# Patient Record
Sex: MALE | Race: WHITE
[De-identification: names, ages, dates, MRNs, and addresses within clinical notes are randomized per-mention and may not be internally consistent; named-entity substitution may affect disease eponyms.]

---

## 2018-05-04 ENCOUNTER — HOSPITAL ENCOUNTER (OUTPATIENT)
Dept: HOSPITAL 75 - RAD | Age: 43
End: 2018-05-04
Attending: INTERNAL MEDICINE
Payer: COMMERCIAL

## 2018-05-04 DIAGNOSIS — R10.9: Primary | ICD-10-CM

## 2018-05-04 LAB
ALBUMIN SERPL-MCNC: 4.7 GM/DL (ref 3.2–4.5)
ALP SERPL-CCNC: 93 U/L (ref 40–136)
ALT SERPL-CCNC: 17 U/L (ref 0–55)
AMYLASE SERPL-CCNC: 64 U/L (ref 25–125)
APTT PPP: YELLOW S
BACTERIA #/AREA URNS HPF: NEGATIVE /HPF
BASOPHILS # BLD AUTO: 0 10^3/UL (ref 0–0.1)
BASOPHILS NFR BLD AUTO: 0 % (ref 0–10)
BILIRUB SERPL-MCNC: 1.1 MG/DL (ref 0.1–1)
BILIRUB UR QL STRIP: NEGATIVE
BUN/CREAT SERPL: 15
CALCIUM SERPL-MCNC: 9.8 MG/DL (ref 8.5–10.1)
CHLORIDE SERPL-SCNC: 106 MMOL/L (ref 98–107)
CO2 SERPL-SCNC: 23 MMOL/L (ref 21–32)
CREAT SERPL-MCNC: 0.85 MG/DL (ref 0.6–1.3)
EOSINOPHIL # BLD AUTO: 0 10^3/UL (ref 0–0.3)
EOSINOPHIL NFR BLD AUTO: 0 % (ref 0–10)
ERYTHROCYTE [DISTWIDTH] IN BLOOD BY AUTOMATED COUNT: 12.4 % (ref 10–14.5)
ERYTHROCYTE [SEDIMENTATION RATE] IN BLOOD: 2 MM/HR (ref 0–15)
FIBRINOGEN PPP-MCNC: CLEAR MG/DL
GFR SERPLBLD BASED ON 1.73 SQ M-ARVRAT: > 60 ML/MIN
GLUCOSE SERPL-MCNC: 112 MG/DL (ref 70–105)
GLUCOSE UR STRIP-MCNC: NEGATIVE MG/DL
HCT VFR BLD CALC: 44 % (ref 40–54)
HGB BLD-MCNC: 15.2 G/DL (ref 13.3–17.7)
KETONES UR QL STRIP: (no result)
LEUKOCYTE ESTERASE UR QL STRIP: NEGATIVE
LIPASE SERPL-CCNC: 13 U/L (ref 8–78)
LYMPHOCYTES # BLD AUTO: 1.2 X 10^3 (ref 1–4)
LYMPHOCYTES NFR BLD AUTO: 21 % (ref 12–44)
MANUAL DIFFERENTIAL PERFORMED BLD QL: NO
MCH RBC QN AUTO: 30 PG (ref 25–34)
MCHC RBC AUTO-ENTMCNC: 35 G/DL (ref 32–36)
MCV RBC AUTO: 86 FL (ref 80–99)
MONOCYTES # BLD AUTO: 0.4 X 10^3 (ref 0–1)
MONOCYTES NFR BLD AUTO: 7 % (ref 0–12)
NEUTROPHILS # BLD AUTO: 4 X 10^3 (ref 1.8–7.8)
NEUTROPHILS NFR BLD AUTO: 71 % (ref 42–75)
NITRITE UR QL STRIP: NEGATIVE
PH UR STRIP: 6.5 [PH] (ref 5–9)
PLATELET # BLD: 246 10^3/UL (ref 130–400)
PMV BLD AUTO: 9.5 FL (ref 7.4–10.4)
POTASSIUM SERPL-SCNC: 4 MMOL/L (ref 3.6–5)
PROT SERPL-MCNC: 7.3 GM/DL (ref 6.4–8.2)
PROT UR QL STRIP: NEGATIVE
RBC # BLD AUTO: 5.06 10^6/UL (ref 4.35–5.85)
RBC #/AREA URNS HPF: (no result) /HPF
SODIUM SERPL-SCNC: 138 MMOL/L (ref 135–145)
SP GR UR STRIP: 1.01 (ref 1.02–1.02)
SQUAMOUS #/AREA URNS HPF: (no result) /HPF
UROBILINOGEN UR-MCNC: NORMAL MG/DL
WBC # BLD AUTO: 5.6 10^3/UL (ref 4.3–11)
WBC #/AREA URNS HPF: (no result) /HPF

## 2018-05-04 PROCEDURE — 83690 ASSAY OF LIPASE: CPT

## 2018-05-04 PROCEDURE — 80053 COMPREHEN METABOLIC PANEL: CPT

## 2018-05-04 PROCEDURE — 81000 URINALYSIS NONAUTO W/SCOPE: CPT

## 2018-05-04 PROCEDURE — 86141 C-REACTIVE PROTEIN HS: CPT

## 2018-05-04 PROCEDURE — 36415 COLL VENOUS BLD VENIPUNCTURE: CPT

## 2018-05-04 PROCEDURE — 82150 ASSAY OF AMYLASE: CPT

## 2018-05-04 PROCEDURE — 76700 US EXAM ABDOM COMPLETE: CPT

## 2018-05-04 PROCEDURE — 85652 RBC SED RATE AUTOMATED: CPT

## 2018-05-04 PROCEDURE — 85025 COMPLETE CBC W/AUTO DIFF WBC: CPT

## 2018-05-04 NOTE — DIAGNOSTIC IMAGING REPORT
CLINICAL INDICATION: Patient with abdominal pain.



COMPARISON: None. 



FINDINGS: 



LIVER: The visualized portions of the liver is normal in shape

and echogenicity without focal lesions. The liver measures

roughly 15 cm in craniocaudal dimension. The visualized portal

veins demonstrates hepatopetal flow.



GALLBLADDER: The gallbladder is normal in size, shape and wall

thickness without stones, sludge, or masses. 



BILE DUCTS: There is no evidence of intra- or extra-hepatic

biliary dilatation.  The common duct measured a maximum of 3.7 mm

in diameter.  



PANCREAS: Portions of the pancreatic tail are obscured by

overlying bowel gas. Otherwise, the remaining visualized portions

of the pancreas has normal size, shape, and echogenicity without

focal lesions.  



SPLEEN: The spleen has normal echogenicity and configuration. The

spleen measures 9.3 cm.



ABDOMINAL VASCULATURE: Visualized portions of the abdominal aorta

and proximal bilateral common iliac arteries demonstrate smooth

contours and are normal in caliber.  There is no aneurysmal

dilation seen.



Visualized portion of the IVC is unremarkable.



KIDNEYS: Both kidneys are normal in size, shape, echogenicity and

cortical thickness without hydronephrosis, stones, or focal

lesions with right and left kidneys measuring 9.6 cm and 11.0 cm

in their craniocaudal dimensions, respectively.



There is no abdominal ascites.



IMPRESSION:  

1: Incomplete visualization of the pancreas tail which is

obscured by bowel gas. If there is clinical concern for

pancreatic abnormality, serology tests may better evaluate. 



2:  Otherwise, unremarkable abdominal ultrasound with no evidence

of acute process. 















Dictated by: 



  Dictated on workstation # [id number]

## 2018-05-08 ENCOUNTER — HOSPITAL ENCOUNTER (OUTPATIENT)
Dept: HOSPITAL 75 - ENDO | Age: 43
Discharge: HOME | End: 2018-05-08
Attending: SURGERY
Payer: COMMERCIAL

## 2018-05-08 VITALS — DIASTOLIC BLOOD PRESSURE: 72 MMHG | SYSTOLIC BLOOD PRESSURE: 112 MMHG

## 2018-05-08 VITALS — SYSTOLIC BLOOD PRESSURE: 114 MMHG | DIASTOLIC BLOOD PRESSURE: 77 MMHG

## 2018-05-08 VITALS — DIASTOLIC BLOOD PRESSURE: 83 MMHG | SYSTOLIC BLOOD PRESSURE: 117 MMHG

## 2018-05-08 VITALS — DIASTOLIC BLOOD PRESSURE: 77 MMHG | SYSTOLIC BLOOD PRESSURE: 114 MMHG

## 2018-05-08 VITALS — BODY MASS INDEX: 24.14 KG/M2 | HEIGHT: 69 IN | WEIGHT: 163 LBS

## 2018-05-08 DIAGNOSIS — K29.70: Primary | ICD-10-CM

## 2018-05-08 PROCEDURE — 88305 TISSUE EXAM BY PATHOLOGIST: CPT

## 2018-05-08 NOTE — XMS REPORT
Vianey Stiles DO, FACP Clinical Summary

 Created on: 2015



[facility]

External Reference #: [id number]

: 1975

Sex: Male



Demographics







 Address  [address] [place]  [address]

 

 Home Phone  [phone]

 

 Preferred Language  Unknown

 

 Marital Status  M

 

 [employer]ology Affiliation  Unknown

 

 Race  Unknown

 

 Ethnic Group  Unknown





Author







 Author  User, Trippeo

 

 Organization  [facility], F[facility]

 

 Address  Unknown

 

 Phone  [phone]







Allergies, Adverse Reactions, Alerts







 Allergy Name  Reaction Description  Start Date  Severity  Status  Provider

 

 No Known Allergies             Vianey Stiles







Conditions or Problems







 Problem Name  Problem Code  Onset Date  Status  Entry Date  Provider  Comment  
Standard Description  Annotate

 

 SACROILIITIS, NOT ELSEWHERE CLASSIFIED  720.2    Active    
Vianey Stiles     Sacroiliitis, not elsewhere classified   

 

 OTHER PSORIASIS  696.1    Active    Vianey Stiles  
   Other psoriasis   

 

 BACK PAIN  724.5    Active    Vianey Stiles     
Backache, unspecified   

 

 MUSCLE PAIN  729.1    Active    Vianey Stiles     
Myalgia and myositis, unspecified   

 

 HEALTH SCREENING  V70.0    Active    Vianey Stiles 
    Routine general medical examination at a health care facility   







Medication List







 Medication  Instructions  Start Date  Stop Date  Generic Name  NDC  Status  
Provider  Patient Instruction

 

 BETAMETHASONE DIPROPIONATE 0.05 % OINT  apply to affected areas prn       BETAMETHASONE DIPROPIONATE  [id number]  Active  Vianey Stiles   







Vital Signs







 Date  Name  Value  Unit  Range  Description

 

   blood pressure, diastolic - 8462-4  83  mm[Hg]     BP haney

 

   blood pressure, systolic - 8480-6  132  mm[Hg]     BP sys

 

   pulse rate E&M - 8867-4  68  /min     Heart rate

 

   respiratory rate E&M - 9279-1  14  /min     Resp rate

 

   weight E&M - 3141-9  170  [lb_av]     Weight Measured

 

   blood pressure, diastolic - 8462-4  85  mm[Hg]     BP haney

 

   blood pressure, systolic - 8480-6  120  mm[Hg]     BP sys

 

   height E&M - 8302-2  69  [in_us]     Bdy height

 

   pulse rate E&M - 8867-4  82  /min     Heart rate

 

   respiratory rate E&M - 9279-1  14  /min     Resp rate

 

   temperature E&M  98.6  [degF]     Body temperature

 

   weight E&M - 3141-9  170  [lb_av]     Weight Measured







Diagnostic Results







 Date  Name  Value  Unit  Range  Description

 

 Clinical Lists Update: CBC,CMP,ESR,TSH,FREE T4,HGA1C,UA - Chemistry 

 

   Estimated Glomerular Filtration Rate (calc)  >60  mL/min/1.73m2   
   

 

   alkaline phosphatase, serum  80  U/L      

 

   sodium, serum  138  mmol/L      

 

   bilirubin, serum, total  0.5  mg/dL      

 

   alanine aminotransferase (SGPT), serum  24  U/L      

 

   aspartate aminotransferase (SGOT), serum  17  U/L      

 

   protein, total, serum  7.0  g/dL      

 

   potassium, serum  3.8  mmol/L      

 

   thyroid stimulating hormone, serum  2.68  u[iU]/mL      

 

   hemoglobin A1C, blood, as % of total hemoglobin  5.8  %      

 

   thyroxine, serum, free  0.88  ng/dL      

 

   albumin, serum  4.4  g/dL      

 

   creatinine, serum  0.91  mg/dL      

 

   carbon dioxide, venous blood  23  mmol/L      

 

   chloride, serum  106  mmol/L      

 

   calcium, serum  9.3  mg/dL      

 

   urea nitrogen, blood  16  mg/dL      

 

   glucose, plasma fasting  98  mg/dL      

 

 Clinical Lists Update: CBC,CMP,ESR,TSH,FREE T4,HGA1C,UA - Hematology 

 

   leukocyte count, blood  6.7  10*3/mm3      

 

   erythrocyte sedimentation rate  6  mm/h      

 

   hematocrit, blood  42  %      

 

   hemoglobin, blood  14.7  g/dL      

 

   platelet count  215  10*3/mm3      

 

   erythrocyte (RBC) count  4.89  10*6/mm3      

 

   mean corpuscular volume, RBC  86  fL      

 

   red blood cell distribution width  12.4  %      

 

 Clinical Lists Update: CBC,CMP,ESR,TSH,FREE T4,HGA1C,UA - Urinalysis 

 

   glucose, urine, semiquantitative  neg         

 

   blood in urine (hemoglobin) by dipstick  neg         

 

   mucus on urinalysis  neg         

 

   epithelial cells, urine  rare  /[LPF]      

 

   hyaline casts, urine  none  /[LPF]      

 

   bacteria, urine microscopy  neg         

 

   RBC urine by microscopy  none         

 

   WBC urine on microscopy  none  {Cells}/[HPF]      

 

   appearance, urine  Clear Yellow          

 

   bilirubin, urine  neg         

 

   ketones, urine, by test strip  neg         

 

   nitrite, urine, semiquantitative  neg         

 

   pH, urine, semiquantitative  6         

 

   protein, urine, semiquantitative (dipstick)  neg         

 

   specific gravity, urine  1.010         

 

   urobilinogen, urine, semiquantitative (dipstick)  normal          

 

 Clinical Lists Update: FERRITIN - Chemistry 

 

   ferritin, serum  97  ng/mL      







Encounters







 Code  Encounter  Date  Provider  Facility

 

 CPT-02516  Ofc Vst, New Level III   17:08:21 CDT  [facility]







Procedures







 Code  Procedure Name  Date  Entry Date  Standard Description

 

 CPT-45149  Preventive, Est, (40-64)   16:37:57 CDT

## 2018-05-08 NOTE — XMS REPORT
Vianey Stiles DO, FACP Clinical Summary

 Created on: 2015



[facility]

External Reference #: [id number]

: 1975

Sex: Male



Demographics







 Address  [address] [place]  [address]

 

 Home Phone  [phone]

 

 Preferred Language  Unknown

 

 Marital Status  M

 

 Worship Affiliation  Unknown

 

 Race  Unknown

 

 Ethnic Group  Unknown





Author







 Author  User, Elevation [employer]DANDY

 

 Organization  [facility] F[facility]

 

 Address  Unknown

 

 Phone  [phone]







Allergies, Adverse Reactions, Alerts







 Allergy Name  Reaction Description  Start Date  Severity  Status  Provider

 

 No Known Allergies             [facility]







Conditions or Problems







 Problem Name  Problem Code  Onset Date  Status  Entry Date  Provider  Comment  
Standard Description  Annotate

 

 SACROILIITIS, NOT ELSEWHERE CLASSIFIED  720.2    Active    
[facility]ianey Stiles     Sacroiliitis, not elsewhere classified   

 

 OTHER PSORIASIS  696.1    Active    [facility]ian[facility] Stiles  
   Other psoriasis   

 

 BACK PAIN  724.5    Active    Vianey Stiles     
Backache, unspecified   

 

 MUSCLE PAIN  729.1    Active    [facility] St[facility]     
Myalgia and myositis, unspecified   







Medication List







 Medication  Instructions  Start Date  Stop Date  Generic Name  NDC  Status  
Provider  Patient Instruction

 

 BETAMETHASONE DIPROPIONATE 0.05 % OINT  apply to affected areas prn       BETAMETHASONE DIPROPIONATE  [id number]  Active  Vianey Stiles   







Vital Signs







 Date  Name  Value  Unit  Range  Description

 

   blood pressure, diastolic - 8462-4  85  mm[Hg]     BP haney

 

   blood pressure, systolic - 8480-6  120  mm[Hg]     BP sys

 

   height E&M - 8302-2  69  [in_us]     Bdy height

 

   pulse rate E&M - 8867-4  82  /min     Heart rate

 

   respiratory rate E&M - 9279-1  14  /min     Resp rate

 

   temperature E&M  98.6  [degF]     Body temperature

 

   weight E&M - 3141-9  170  [lb_av]     Weight Measured







Diagnostic Results







 Date  Name  Value  Unit  Range  Description

 

 Clinical Lists Update: CBC,CMP,ESR,TSH,FREE T4,HGA1C,UA - Chemistry 

 

   Estimated Glomerular Filtration Rate (calc)  >60  mL/min/1.73m2   
   

 

   alkaline phosphatase, serum  80  U/L      

 

   sodium, serum  138  mmol/L      

 

   bilirubin, serum, total  0.5  mg/dL      

 

   alanine aminotransferase (SGPT), serum  24  U/L      

 

   aspartate aminotransferase (SGOT), serum  17  U/L      

 

   protein, total, serum  7.0  g/dL      

 

   potassium, serum  3.8  mmol/L      

 

   thyroid stimulating hormone, serum  2.68  u[iU]/mL      

 

   hemoglobin A1C, blood, as % of total hemoglobin  5.8  %      

 

   thyroxine, serum, free  0.88  ng/dL      

 

   albumin, serum  4.4  g/dL      

 

   creatinine, serum  0.91  mg/dL      

 

   carbon dioxide, venous blood  23  mmol/L      

 

   chloride, serum  106  mmol/L      

 

   calcium, serum  9.3  mg/dL      

 

   urea nitrogen, blood  16  mg/dL      

 

   glucose, plasma fasting  98  mg/dL      

 

 Clinical Lists Update: CBC,CMP,ESR,TSH,FREE T4,HGA1C,UA - Hematology 

 

   leukocyte count, blood  6.7  10*3/mm3      

 

   erythrocyte sedimentation rate  6  mm/h      

 

   hematocrit, blood  42  %      

 

   hemoglobin, blood  14.7  g/dL      

 

   platelet count  215  10*3/mm3      

 

   erythrocyte (RBC) count  4.89  10*6/mm3      

 

   mean corpuscular volume, RBC  86  fL      

 

   red blood cell distribution width  12.4  %      

 

 Clinical Lists Update: CBC,CMP,ESR,TSH,FREE T4,HGA1C,UA - Urinalysis 

 

   glucose, urine, semiquantitative  neg         

 

   blood in urine (hemoglobin) by dipstick  neg         

 

   mucus on urinalysis  neg         

 

   epithelial cells, urine  rare  /[LPF]      

 

   hyaline casts, urine  none  /[LPF]      

 

   bacteria, urine microscopy  neg         

 

   RBC urine by microscopy  none         

 

   WBC urine on microscopy  none  {Cells}/[HPF]      

 

   appearance, urine  Clear Yellow          

 

   bilirubin, urine  neg         

 

   ketones, urine, by test strip  neg         

 

   nitrite, urine, semiquantitative  neg         

 

   pH, urine, semiquantitative  6         

 

   protein, urine, semiquantitative (dipstick)  neg         

 

   specific gravity, urine  1.010         

 

   urobilinogen, urine, semiquantitative (dipstick)  normal          

 

 Clinical Lists Update: FERRITIN - Chemistry 

 

   ferritin, serum  97  ng/mL      







Encounters







 Code  Encounter  Date  Provider  Facility

 

 CPT-09796  Ofc Vst, New Level III   17:08:21 CDT  [facility]

## 2018-05-08 NOTE — PROGRESS NOTE-POST OPERATIVE
Post-Operative Progess Note


Surgeon (s)/Assistant (s)


Surgeon


JASON CARABALLO DO


Assistant:  none





Pre-Operative Diagnosis


Gastritis, Abdominal Fullnes, Abdominal pain





Post-Operative Diagnosis





Gastritis


?? Haynes's Esophagus


Esophageal polyp





Procedure & Operative Findings


Date of Procedure


5/8/18


Procedure Performed/Findings


EGD with biopsy


Anesthesia Type


IV sedation by CRNA





Estimated Blood Loss


Estimated blood loss (mL):  scant





Specimens/Packing


Specimens Removed


Antral bx


GE jxn bx


Esophageal bx











JASON CARABALLO DO May 8, 2018 11:41

## 2018-05-08 NOTE — PROGRESS NOTE-PRE OPERATIVE
Pre-Operative Progress Note


H&P Reviewed


The H&P was reviewed, patient examined and no changes noted.


Time Seen by Provider:  11:11


Date H&P Reviewed:  May 8, 2018


Time H&P Reviewed:  11:13


Pre-Operative Diagnosis:  Gastritis, Abdominal Fullnes, Abdominal pain











JASON CARABALLO DO May 8, 2018 11:16

## 2018-05-08 NOTE — XMS REPORT
Vianey Stiles DO, FACP Clinical Summary

 Created on: 2015



[facility]

External Reference #: [id number]

: 1975

Sex: Male



Demographics







 Address  [address]

[place]  [address]

 

 Home Phone  [phone]

 

 Preferred Language  Unknown

 

 Marital Status  M

 

 Confucianist Affiliation  Unknown

 

 Race  Unknown

 

 Ethnic Group  Unknown





Author







 Author  User, MESoftDANDY

 

 Organization  [facility] DO, FACP

 

 Address  Unknown

 

 Phone  [phone]







Allergies, Adverse Reactions, Alerts







 Allergy Name  Reaction Description  Start Date  Severity  Status  Provider

 

 No Known Allergies             Vianey Stiles







Conditions or Problems







 Problem Name  Problem Code  Onset Date  Status  Entry Date  Provider  Comment  
Standard Description  Annotate

 

 SACROILIITIS, NOT ELSEWHERE CLASSIFIED  720.2    Active    
Vianey Stiles     Sacroiliitis, not elsewhere classified   

 

 OTHER PSORIASIS  696.1    Active    [facility]ey Stiles  
   Other psoriasis   

 

 BACK PAIN  724.5    Active    Vianey Stiles     
Backache, unspecified   

 

 MUSCLE PAIN  729.1    Active    [facility]ey Stiles     
Myalgia and myositis, unspecified   

 

 HEALTH SCREENING  V70.0    Active    Vianey Stiles 
    Routine general medical examination at a health care facility   

 

 ABDOMINAL PAIN, GENERALIZED  789.07    Active    Vianey Stiles     Abdominal pain, generalized   

 

 GERD  530.81    Active    Vianey Stiles     
Esophageal reflux   







Medication List







 Medication  Instructions  Start Date  Stop Date  Generic Name  NDC  Status  
Provider  Patient Instruction

 

 CARAFATE 1 GM TABS  1 PO 30 minutes before meals and at bedtime     
  SUCRALFATE  [id number]  Active  [facility]   

 

 BETAMETHASONE DIPROPIONATE 0.05 % OINT  apply to affected areas prn       BETAMETHASONE DIPROPIONATE  [id number]  Active  [facility]   







Vital Signs







 Date  Name  Value  Unit  Range  Description

 

   blood pressure, diastolic - 8462-4  88  mm[Hg]     BP haney

 

   blood pressure, systolic - 8480-6  154  mm[Hg]     BP sys

 

   pulse rate E&M - 8867-4  80  /min     Heart rate

 

   respiratory rate E&M - 9279-1  14  /min     Resp rate

 

   temperature E&M  98.6  [degF]     Body temperature

 

   weight E&M - 3141-9  165  [lb_av]     Weight Measured

 

   blood pressure, diastolic - 8462-4  83  mm[Hg]     BP haney

 

   blood pressure, systolic - 8480-6  132  mm[Hg]     BP sys

 

   pulse rate E&M - 8867-4  68  /min     Heart rate

 

   respiratory rate E&M - 9279-1  14  /min     Resp rate

 

   weight E&M - 3141-9  170  [lb_av]     Weight Measured

 

   blood pressure, diastolic - 8462-4  85  mm[Hg]     BP haney

 

   blood pressure, systolic - 8480-6  120  mm[Hg]     BP sys

 

   height E&M - 8302-2  69  [in_us]     Bdy height

 

   pulse rate E&M - 8867-4  82  /min     Heart rate

 

   respiratory rate E&M - 9279-1  14  /min     Resp rate

 

   temperature E&M  98.6  [degF]     Body temperature

 

   weight E&M - 3141-9  170  [lb_av]     Weight Measured







Diagnostic Results







 Date  Name  Value  Unit  Range  Description

 

 Clinical Lists Update: CBC,CMP,ESR,TSH,FREE T4,HGA1C,UA - Chemistry 

 

   albumin, serum  4.4  g/dL      

 

   alkaline phosphatase, serum  80  U/L      

 

   glucose, plasma fasting  98  mg/dL      

 

   sodium, serum  138  mmol/L      

 

   bilirubin, serum, total  0.5  mg/dL      

 

   alanine aminotransferase (SGPT), serum  24  U/L      

 

   aspartate aminotransferase (SGOT), serum  17  U/L      

 

   protein, total, serum  7.0  g/dL      

 

   potassium, serum  3.8  mmol/L      

 

   thyroid stimulating hormone, serum  2.68  u[iU]/mL      

 

   hemoglobin A1C, blood, as % of total hemoglobin  5.8  %      

 

   thyroxine, serum, free  0.88  ng/dL      

 

   creatinine, serum  0.91  mg/dL      

 

   carbon dioxide, venous blood  23  mmol/L      

 

   chloride, serum  106  mmol/L      

 

   calcium, serum  9.3  mg/dL      

 

   urea nitrogen, blood  16  mg/dL      

 

   Estimated Glomerular Filtration Rate (calc)  >60  mL/min/1.73m2   
   

 

 Clinical Lists Update: CBC,CMP,ESR,TSH,FREE T4,HGA1C,UA - Hematology 

 

   leukocyte count, blood  6.7  10*3/mm3      

 

   erythrocyte sedimentation rate  6  mm/h      

 

   hematocrit, blood  42  %      

 

   hemoglobin, blood  14.7  g/dL      

 

   platelet count  215  10*3/mm3      

 

   erythrocyte (RBC) count  4.89  10*6/mm3      

 

   mean corpuscular volume, RBC  86  fL      

 

   red blood cell distribution width  12.4  %      

 

 Clinical Lists Update: CBC,CMP,ESR,TSH,FREE T4,HGA1C,UA - Urinalysis 

 

   glucose, urine, semiquantitative  neg         

 

   blood in urine (hemoglobin) by dipstick  neg         

 

   mucus on urinalysis  neg         

 

   epithelial cells, urine  rare  /[LPF]      

 

   hyaline casts, urine  none  /[LPF]      

 

   bacteria, urine microscopy  neg         

 

   RBC urine by microscopy  none         

 

   WBC urine on microscopy  none  {Cells}/[HPF]      

 

   appearance, urine  Clear Yellow          

 

   bilirubin, urine  neg         

 

   ketones, urine, by test strip  neg         

 

   nitrite, urine, semiquantitative  neg         

 

   pH, urine, semiquantitative  6         

 

   protein, urine, semiquantitative (dipstick)  neg         

 

   specific gravity, urine  1.010         

 

   urobilinogen, urine, semiquantitative (dipstick)  normal          

 

 Clinical Lists Update: FERRITIN - Chemistry 

 

   ferritin, serum  97  ng/mL      







Encounters







 Code  Encounter  Date  Provider  Facility

 

 CPT-91603  Ofc Vst, Est Level IV   19:17:20 CDT  [facility]

 

 CPT-51897  Ofc Vst, New Level III   17:08:21 CDT  [facility]







Procedures







 Code  Procedure Name  Date  Entry Date  Standard Description

 

 CPT-15459  Preventive, Est, (40-64)   16:37:57 CDT

## 2018-05-08 NOTE — XMS REPORT
Clinical Summary

 Created on: 2018



[facility]

External Reference #: [id number]

: 1975

Sex: Male



Demographics







 Address  [address]

[place]  [address]

 

 Home Phone  [phone]

 

 Preferred Language  English

 

 Marital Status  Unknown

 

 Islam Affiliation  NON

 

 Race  White

 

 Ethnic Group  Not  or 





Author







 Author  [facility]

 

 Organization  [facility]

 

 Address  Unknown

 

 Phone  Unavailable







Support







 Name  Relationship  Address  Phone

 

 [address] 

[place]  [address]  [phone]







Care Team Providers







 Care Team Member Name  Role  Phone

 

 Merrill Jiang   PCP  [phone]

 

 Marco Yeboah MD  Unavailable  [phone]







Source Comments

Some departments are not documenting in the electronic medical record.  If you 
do not see the information that you expected, contact Release of Information in 
the Health Information Management department at [phone] for further 
assistance in locating additional records.[facility]



Allergies

No Known Allergies



Current Medications







      



  Prescription   Sig.   Disp.   Refills   Start   End Date   Status



      Date  

 

      



  morphine SR (MS CONTIN)   Take 2 Tabs by mouth   60   0   /20/20    Active



  15 mg tablet   Twice Daily.     08  

 

      



  senna/docusate   Take 1 Tab by mouth Every   60   0   07/20/20    Active



  (SENOKOT-S) 8.6/50 mg   12 Hours as needed for     08  



  tablet   constipation     

 

      



  oxycodone/acetaminophen   Take 1-2 Tabs by mouth   120   0   /20/20    
Active



  (PERCOCET) 5/325 mg   Every 4 Hours as needed     08  



  tablet   for Pain.     

 

      



  promethazine (PHENERGAN)   Take 1 Tab by mouth Every   50   0   /20/20    
Active



  25 mg tablet   6 Hours as needed for     08  



   Nausea.     







Active Problems







 



  Problem   Noted Date

 

 



  Fracture calcaneus-closed   2008







Social History







    



  Tobacco Use   Types   Packs/Day   Years Used   Date

 

    



  Never Smoker    









   



  Alcohol Use   Drinks/Week   oz/Week   Comments

 

   



  Yes   12 Cans of   144.0 



   beer  









 



  Sex Assigned at Birth   Date Recorded

 

 



  Not on file 







Last Filed Vital Signs







  



  Vital Sign   Reading   Time Taken

 

  



  Blood Pressure   125/75   2008  7:00 AM CDT

 

  



  Pulse   72   2008  7:00 AM CDT

 

  



  Temperature   36.7 C (98 F)   2008  7:00 AM CDT

 

  



  Respiratory Rate   -   -

 

  



  Oxygen Saturation   95%   2008  7:00 AM CDT

 

  



  Inhaled Oxygen   -   -



  Concentration  

 

  



  Weight   -   -

 

  



  Height   -   -

 

  



  Body Mass Index   -   -







Plan of Treatment







   



  Health Maintenance   Due Date   Last Done   Comments

 

   



  PHYSICAL (COMPREHENSIVE)   10/07/1981  



  EXAM   

 

   



  PERTUSSIS VACCINE   10/07/1985  

 

   



  HIV SCREENING   10/07/1989  

 

   



  TETANUS VACCINE   10/07/1991  

 

   



  INFLUENZA VACCINE   10/01/2018  







Results

Not on filefrom Last 3 Months

## 2018-05-08 NOTE — ENDOSCOPY DISCHARGE INSTRUCT
Endo Procedure/Findings


Findings


1.:  Gastritis


2.:  Haynes's Esophagus


3.:  Other Findings (??Esophageal polyp)





Discharge Instructions


-


Activity: You might feel a little sleepy until tomorrow.  This is due to the 

medicine you received to relax you.





Until tomorrow, you should:  


   NOT drive a car, operate machinery or power tools.


   NOT drink any alcoholic beverages.


   NOT make any important decisions or sign importortant papers.





Do not return to work until tomorrow, unless otherwise instructed. Resume 

previous activities tomorrow.





Diet: Start by taking liquids.  If you tolerate liquids, advance to solid food.





Make appointment for one week.


Instructions:


1.:  EGD in 6-8 weeks





Notify Physician


-


If you experience excessive bleeding, unusual abdominal pain, fever, or chest 

pain, contact your doctor immediately.





Follow-Up:


-


I have received and understand the above instructions and will call my doctor 

if I have any further questions.








________________________             ____________


Patient Signature                Date





________________________


Nurse Signature








________________________


Other (Relationship)











JASON CARABALLO DO May 8, 2018 11:43

## 2018-05-09 NOTE — OPERATIVE REPORT
DATE OF SERVICE:  05/08/2018



PREOPERATIVE DIAGNOSES:

Gastritis, abdominal fullness.



POSTOPERATIVE DIAGNOSES:

1.  Gastritis.

2.  Questionable Haynes's esophagus.

3.  Questionable esophageal polyp.



PROCEDURE:

EGD with biopsy.



SURGEON:

Chema Pemberton DO.



FIRST ASSISTANT:

None.



ANESTHESIA:

IV sedation by the CRNA.



SPECIMEN:

One biopsy from the antrum, one biopsy from the GE junction, one biopsy from the

upper esophagus.



BLOOD LOSS:

Scant.



FLUIDS:

Per anesthesia.



POSTOPERATIVE CONDITION:

Stable.



INDICATION FOR PROCEDURE:

The patient is a 42-year-old male who has been having some gastritis symptoms

and fullness feel that he gets a full feeling after eating, needed an EGD.



FINDINGS:

The patient had some gastritis.  The antrum looked a little bit red, duodenum

looked okay.  He had possible some change of the GE junction, picture was taken

as well as the possible flat polyp in the upper esophagus.



PROCEDURE NOTE:

After informed consent was obtained, the patient was brought to the endoscopy

suite, placed in the bed in the left lateral decubitus position.  He was

administered IV sedation by the CRNA who then monitored his vitals the entire

time, heart rate, blood pressure, and pulse ox and a scope was then inserted,

pushed down the mouth through the esophagus and into the stomach.  Upon

entering, looked at the antrum saw some erythema.  Took a picture of this and

then pushed into the first portion of duodenum.  Duodenum looked good, backed up

and into the antrum, took a cold biopsy of antrum and then pulled back to look

at the rest of the stomach, retroflexed to see the upper portion, did not see

any hiatal hernia and then pulled the scope back into the esophagus.  At the GE

junction, it did look like there was some creeping up of the Z line.  Picture

was taken then, a biopsy done at this spot and then pulled up the esophagus a

little bit further, saw what might have been a flat polyp, did a biopsy of this

flat polyp and then pulled the scope up the esophagus and out the mouth.  The

patient tolerated the procedure and he was recovered in endoscopy suite.





Job ID: [id number]

DocumentID: [id number]

Dictated Date:  05/09/2018 10:16:14

Transcription Date: 05/09/2018 19:52:01

Dictated By: CHEMA PEMBERTON DO

## 2018-06-29 ENCOUNTER — HOSPITAL ENCOUNTER (OUTPATIENT)
Dept: HOSPITAL 75 - PREOP | Age: 43
End: 2018-06-29
Attending: SURGERY
Payer: COMMERCIAL

## 2018-06-29 VITALS — HEIGHT: 69 IN | BODY MASS INDEX: 24.14 KG/M2 | WEIGHT: 163 LBS

## 2018-06-29 DIAGNOSIS — Z01.818: Primary | ICD-10-CM

## 2018-07-02 ENCOUNTER — HOSPITAL ENCOUNTER (OUTPATIENT)
Dept: HOSPITAL 75 - ENDO | Age: 43
Discharge: HOME | End: 2018-07-02
Attending: SURGERY
Payer: COMMERCIAL

## 2018-07-02 VITALS — WEIGHT: 163 LBS | BODY MASS INDEX: 24.14 KG/M2 | HEIGHT: 69 IN

## 2018-07-02 VITALS — DIASTOLIC BLOOD PRESSURE: 80 MMHG | SYSTOLIC BLOOD PRESSURE: 112 MMHG

## 2018-07-02 VITALS — DIASTOLIC BLOOD PRESSURE: 75 MMHG | SYSTOLIC BLOOD PRESSURE: 114 MMHG

## 2018-07-02 VITALS — DIASTOLIC BLOOD PRESSURE: 86 MMHG | SYSTOLIC BLOOD PRESSURE: 132 MMHG

## 2018-07-02 DIAGNOSIS — R19.5: ICD-10-CM

## 2018-07-02 DIAGNOSIS — Z80.0: ICD-10-CM

## 2018-07-02 DIAGNOSIS — K21.9: ICD-10-CM

## 2018-07-02 DIAGNOSIS — K52.9: Primary | ICD-10-CM

## 2018-07-02 DIAGNOSIS — K64.8: ICD-10-CM

## 2018-07-02 PROCEDURE — 88305 TISSUE EXAM BY PATHOLOGIST: CPT

## 2018-07-02 NOTE — OPERATIVE REPORT
DATE OF SERVICE:  



PREOPERATIVE DIAGNOSES:

1.  Change in bowel habits.

2.  Abdominal pain.

3.  Strong family history of colon cancer.



POSTOPERATIVE DIAGNOSES:

1.  Change in bowel habits.

2.  Abdominal pain.

3.  Strong family history of colon cancer.

4.  Ileocecal valve inflammation.

5.  Internal hemorrhoids.



PROCEDURE:

Colonoscopy with cold biopsy.



SURGEON:

Chema Pemberton DO.



FIRST ASSISTANT:

None.



ANESTHESIA:

IV sedation by the CRNA.



SPECIMEN:

Biopsy from the ileocecal valve.



BLOOD LOSS:

Scant.



FLUIDS:

Per anesthesia.



POSTOPERATIVE CONDITION:

Stable.



INDICATION FOR PROCEDURE:

The patient is a 42-year-old male who has been having some change in bowel

habits, abdominal pain and has had a strong family history of colon cancer.



FINDINGS:

The patient had some ileocecal valve inflammation.  The terminal ileum looked

fine and he had some grade I almost 2 internal hemorrhoids.



DESCRIPTION OF PROCEDURE:

After informed consent was obtained, the patient was brought to the endoscopy

suite and placed in the left lateral decubitus position.  He was administered IV

sedation by the CRNA who then monitored his vitals the entire time, heart rate,

blood pressure and pulse ox, and the scope was inserted, pushed all the way

about 150 cm, able to get to the cecum, took a picture of appendiceal orifice

and noted the ileocecal valve, which looked like it was very inflamed.  There is

some red and almost ulcerations on it.  Pushed into the terminal ileum, took a

picture of the terminal ileum.  This looked normal.  Did not see any ulcerations

in here.  Backed up and then did a biopsy of the ileocecal valve and then slowly

withdrew the scope, insufflating to look circumferentially at the walls looking

at the cecum up the ascending colon to the hepatic flexure, then down the

transverse colon to the splenic flexure into the descending colon and down into

the sigmoid and rectum, retroflexion in rectal vault, saw some grade I to II

internal hemorrhoids, took a picture of this and then removed the scope.  The

patient tolerated the procedure well and he was recovered in the endoscopy

suite.





Job ID: [id number]

DocumentID: [id number]

Dictated Date:  07/02/2018 10:17:10

Transcription Date: 07/02/2018 16:11:35

Dictated By: CHEMA PEMBERTON DO

## 2018-07-02 NOTE — PROGRESS NOTE-POST OPERATIVE
Post-Operative Progess Note


Surgeon (s)/Assistant (s)


Surgeon


JASON CARABALLO DO


Assistant:  none





Pre-Operative Diagnosis


Change in bowel habit, Abd pain, Strong family hx of colon CA





Post-Operative Diagnosis





Same plus Ileocecal valve inflammation


Int Hem





Procedure & Operative Findings


Date of Procedure


7/2/18


Procedure Performed/Findings


Colon with bx


Anesthesia Type


Iv sedation by CRNA





Estimated Blood Loss


Estimated blood loss (mL):  scant





Specimens/Packing


Specimens Removed


biopsy of IC valve











JASON CARABALLO DO Jul 2, 2018 10:08

## 2018-07-02 NOTE — ANESTHESIA-GENERAL POST-OP
MAC


Patient Condition


Mental Status/LOC:  Same as Preop


Cardiovascular:  Satisfactory


Nausea/Vomiting:  Absent


Respiratory:  Satisfactory


Pain:  Controlled


Complications:  Absent





Post Op Complications


Complications


None





Follow Up Care/Instructions


Patient Instructions


None needed.





Anesthesiology Discharge Order


Discharge Order


Patient is doing well, no complaints, stable vital signs, no apparent adverse 

anesthesia problems.   


No complications reported per nursing.











NATALIIA ADAMS CRNA Jul 2, 2018 14:56

## 2018-07-02 NOTE — ENDOSCOPY DISCHARGE INSTRUCT
Endo Procedure/Findings


Findings


1.:  Other Findings (inflammation of Ileo-cecal valve)


2.:  Internal Hemorrhoids





Discharge Instructions


-


Activity: You might feel a little sleepy until tomorrow.  This is due to the 

medicine you received to relax you.





Until tomorrow, you should:  


   NOT drive a car, operate machinery or power tools.


   NOT drink any alcoholic beverages.


   NOT make any important decisions or sign importortant papers.





Do not return to work until tomorrow, unless otherwise instructed. Resume 

previous activities tomorrow.





Diet: Start by taking liquids.  If you tolerate liquids, advance to solid food.





Make appointment for one week.





Notify Physician


-


If you experience excessive bleeding, unusual abdominal pain, fever, or chest 

pain, contact your doctor immediately.





Follow-Up:


-


I have received and understand the above instructions and will call my doctor 

if I have any further questions.








________________________             ____________


Patient Signature                Date





________________________


Nurse Signature








________________________


Other (Relationship)











JASON CARABALLO DO Jul 2, 2018 10:09

## 2018-07-02 NOTE — XMS REPORT
Clinical Summary

 Created on: 2018



Oral Burkett

External Reference #: [id number]

: 1975

Sex: Male



Demographics







 Address  [address]

[place]  [address]

 

 Home Phone  [phone]

 

 Preferred Language  English

 

 Marital Status  Unknown

 

 Restoration Affiliation  NON

 

 Race  White

 

 Ethnic Group  Not  or 





Author







 Author  [facility]

 

 Organization  [facility]

 

 Address  Unknown

 

 Phone  Unavailable







Support







 Name  Relationship  Address  Phone

 

 [address] 

[place]  [address]  [phone]







Care Team Providers







 Care Team Member Name  Role  Phone

 

 Merrill Jiang   PCP  [phone]

 

 Marco Yeboah MD  Unavailable  [phone]







Source Comments

Some departments are not documenting in the electronic medical record.  If you 
do not see the information that you expected, contact Release of Information in 
the Health Information Management department at [phone] for further 
assistance in locating additional records.[facility]



Allergies

No Known Allergies



Current Medications







      



  Prescription   Sig.   Disp.   Refills   Start   End Date   Status



      Date  

 

      



  morphine SR (MS CONTIN)   Take 2 Tabs by mouth   60   0   /20/20    Active



  15 mg tablet   Twice Daily.     08  

 

      



  senna/docusate   Take 1 Tab by mouth Every   60   0   07/20/20    Active



  (SENOKOT-S) 8.6/50 mg   12 Hours as needed for     08  



  tablet   constipation     

 

      



  oxycodone/acetaminophen   Take 1-2 Tabs by mouth   120   0   /20/20    
Active



  (PERCOCET) 5/325 mg   Every 4 Hours as needed     08  



  tablet   for Pain.     

 

      



  promethazine (PHENERGAN)   Take 1 Tab by mouth Every   50   0   /20/20    
Active



  25 mg tablet   6 Hours as needed for     08  



   Nausea.     







Active Problems







 



  Problem   Noted Date

 

 



  Fracture calcaneus-closed   2008







Social History







    



  Tobacco Use   Types   Packs/Day   Years Used   Date

 

    



  Never Smoker    









   



  Alcohol Use   Drinks/Week   oz/Week   Comments

 

   



  Yes   12 Cans of   144.0 



   beer  









 



  Sex Assigned at Birth   Date Recorded

 

 



  Not on file 







Last Filed Vital Signs







  



  Vital Sign   Reading   Time Taken

 

  



  Blood Pressure   125/75   2008  7:00 AM CDT

 

  



  Pulse   72   2008  7:00 AM CDT

 

  



  Temperature   36.7 C (98 F)   2008  7:00 AM CDT

 

  



  Respiratory Rate   -   -

 

  



  Oxygen Saturation   95%   2008  7:00 AM CDT

 

  



  Inhaled Oxygen   -   -



  Concentration  

 

  



  Weight   -   -

 

  



  Height   -   -

 

  



  Body Mass Index   -   -







Plan of Treatment







   



  Health Maintenance   Due Date   Last Done   Comments

 

   



  PHYSICAL (COMPREHENSIVE)   10/07/1981  



  EXAM   

 

   



  PERTUSSIS VACCINE   10/07/1985  

 

   



  HIV SCREENING   10/07/1989  

 

   



  TETANUS VACCINE   10/07/1991  

 

   



  INFLUENZA VACCINE   10/01/2018  







Results

Not on filefrom Last 3 Months

## 2018-07-02 NOTE — PROGRESS NOTE-PRE OPERATIVE
Pre-Operative Progress Note


H&P Reviewed


The H&P was reviewed, patient examined and no changes noted.


Time Seen by Provider:  08:58


Date H&P Reviewed:  Jul 2, 2018


Time H&P Reviewed:  09:01


Pre-Operative Diagnosis:  Change in bowel habit, Abd pain, Strong family hx of 

colon CA











JASON CARABALLO DO Jul 2, 2018 09:06

## 2018-07-06 ENCOUNTER — HOSPITAL ENCOUNTER (OUTPATIENT)
Dept: HOSPITAL 75 - PREOP | Age: 43
End: 2018-07-06
Attending: SURGERY
Payer: COMMERCIAL

## 2018-07-06 VITALS — BODY MASS INDEX: 24.14 KG/M2 | HEIGHT: 69 IN | WEIGHT: 163 LBS

## 2018-07-06 DIAGNOSIS — Z01.818: Primary | ICD-10-CM

## 2018-07-06 DIAGNOSIS — K82.8: ICD-10-CM

## 2018-07-11 ENCOUNTER — HOSPITAL ENCOUNTER (OUTPATIENT)
Dept: HOSPITAL 75 - SDC | Age: 43
Discharge: HOME | End: 2018-07-11
Attending: SURGERY
Payer: COMMERCIAL

## 2018-07-11 VITALS — WEIGHT: 163 LBS | BODY MASS INDEX: 24.14 KG/M2 | HEIGHT: 69 IN

## 2018-07-11 VITALS — DIASTOLIC BLOOD PRESSURE: 77 MMHG | SYSTOLIC BLOOD PRESSURE: 121 MMHG

## 2018-07-11 VITALS — SYSTOLIC BLOOD PRESSURE: 139 MMHG | DIASTOLIC BLOOD PRESSURE: 81 MMHG

## 2018-07-11 VITALS — SYSTOLIC BLOOD PRESSURE: 121 MMHG | DIASTOLIC BLOOD PRESSURE: 77 MMHG

## 2018-07-11 VITALS — SYSTOLIC BLOOD PRESSURE: 118 MMHG | DIASTOLIC BLOOD PRESSURE: 73 MMHG

## 2018-07-11 DIAGNOSIS — K81.1: Primary | ICD-10-CM

## 2018-07-11 DIAGNOSIS — K40.20: ICD-10-CM

## 2018-07-11 PROCEDURE — 94664 DEMO&/EVAL PT USE INHALER: CPT

## 2018-07-11 PROCEDURE — 87081 CULTURE SCREEN ONLY: CPT

## 2018-07-11 RX ADMIN — SODIUM CHLORIDE, SODIUM LACTATE, POTASSIUM CHLORIDE, AND CALCIUM CHLORIDE PRN MLS/HR: 600; 310; 30; 20 INJECTION, SOLUTION INTRAVENOUS at 11:26

## 2018-07-11 RX ADMIN — SODIUM CHLORIDE, SODIUM LACTATE, POTASSIUM CHLORIDE, AND CALCIUM CHLORIDE PRN MLS/HR: 600; 310; 30; 20 INJECTION, SOLUTION INTRAVENOUS at 09:15

## 2018-07-11 RX ADMIN — SODIUM CHLORIDE, SODIUM LACTATE, POTASSIUM CHLORIDE, AND CALCIUM CHLORIDE PRN MLS/HR: 600; 310; 30; 20 INJECTION, SOLUTION INTRAVENOUS at 09:50

## 2018-07-11 NOTE — PROGRESS NOTE-POST OPERATIVE
Post-Operative Progess Note


Surgeon (s)/Assistant (s)


Surgeon


JASON CARABALLO DO


Assistant:  Chepe





Pre-Operative Diagnosis


Biliary Dyskinesia





Post-Operative Diagnosis





Same


B/L Indirect Inguinal hernia with omentum stuck in left





Procedure & Operative Findings


Date of Procedure


7/11/18


Procedure Performed/Findings


Lap constantine with IOC


Anesthesia Type


GET





Estimated Blood Loss


Estimated blood loss (mL):  scant





Specimens/Packing


Specimens Removed


GB and contents











JASON CARABALLO DO Jul 11, 2018 10:30

## 2018-07-11 NOTE — DIAGNOSTIC IMAGING REPORT
INDICATION:  Undergoing cholecystectomy for abdominal pain and

biliary dyskinesia.



FINDINGS: Multiple intraoperative cholangiogram images are

submitted. There is cannulation of the extra hepatic biliary

tree. Images demonstrate contrast opacification of the biliary

system. Biliary tree is not significantly dilated. Segmental

filling defect over the proximal to mid common bile duct appears

be artifactual. There was no persistent filling defect to

indicate a retained stone. Flow was present into the duodenum.



Fluoroscopy time: 9 seconds



IMPRESSION: Negative laparoscopic cholangiogram.





Dictated by: 



  Dictated on workstation # [id number]

## 2018-07-11 NOTE — PROGRESS NOTE-PRE OPERATIVE
Pre-Operative Progress Note


H&P Reviewed


The H&P was reviewed, patient examined and no changes noted.


Time Seen by Provider:  09:16


Date H&P Reviewed:  Jul 11, 2018


Time H&P Reviewed:  09:18


Pre-Operative Diagnosis:  Biliary Dyskinesia











JASON CARABALLO DO Jul 11, 2018 10:29

## 2018-07-11 NOTE — ANESTHESIA-GENERAL POST-OP
General


Patient Condition


Mental Status/LOC:  Same as Preop


Cardiovascular:  Satisfactory


Nausea/Vomiting:  Absent


Respiratory:  Satisfactory


Pain:  Controlled


Complications:  Absent





Post Op Complications


Complications


None





Follow Up Care/Instructions


Patient Instructions


None needed.





Anesthesia/Patient Condition


Patient Condition


Patient was seen after the procedure and was doing well, no complaints, stable 

vital signs, no apparent adverse anesthesia problems.











JENNA RAINEY DO Jul 11, 2018 15:14

## 2018-07-11 NOTE — XMS REPORT
Continuity of Care Document

 Created on: 2018



NICHOLAS ORLANDO

External Reference #: [id number]

: 1975

Sex: Male



Demographics







 Address  [address] [place] [address]

 

 Home Phone  [phone] x

 

 Preferred Language  Unknown

 

 Marital Status  Unknown

 

 Amish Affiliation  Unknown

 

 Race  Unknown

 

 Ethnic Group  Unknown





Author







 Author  [facility]

 

 Organization  [facility]

 

 Address  Unknown

 

 Phone  Unavailable



              



Allergies

      





 Active            Description            Code            Type            
Severity            Reaction            Onset            Reported/Identified   
         Relationship to Patient            Clinical Status        

 

 Yes            No Known Drug Allergies            [id number]            Drug 
Allergy            Unknown            N/A                         2018   
                               



                  



Medications

      



There is no data.                  



Problems

      





 Date Dx Coded            Attending            Type            Code            
Diagnosis            Diagnosed By        

 

 2015            COLON DO, RACQUEL            Ot            285.9         
                         

 

 2015            COLON DO, RACQUEL            Ot            696.1         
                         

 

 2015            COLON DO, RACQUEL            Ot            719.40        
                          

 

 2015            COLON DO, RACQUEL            Ot            720.2         
                         

 

 2015            COLON DO, RACQUEL            Ot            782.1         
                         

 

 2015                         Ot            825.0                        
          

 

 2015                         Ot            E849.0                       
           

 

 2015                         Ot            E888.9                       
           

 

 2015            COLON DO, RACQUEL            Ot            285.9         
                         

 

 2015            COLON DO, RACQUEL            Ot            696.1         
                         

 

 2015            COLON DO, RACQUEL            Ot            719.40        
                          

 

 2015            COLON DO, RACQUEL            Ot            720.2         
                         

 

 2015            COLON DO, RACQUEL            Ot            782.1         
                         

 

 2015            COLON DO, RACQUEL            Ot            285.9         
                         

 

 2015            COLON DO, RACQUEL            Ot            696.1         
                         

 

 2015            COLON DO, RACQUEL            Ot            719.40        
                          

 

 2015            COLON DO, RACQUEL            Ot            720.2         
                         

 

 2015            COLON DO, RACQUEL            Ot            782.1         
                         

 

 2018            COLON DO, RACQUEL            Ot            285.9         
   ANEMIA NOS                     

 

 2018            COLON DO, RACQUEL            Ot            696.1         
   OTHER PSORIASIS                     

 

 2018            COLON DO, RACQUEL            Ot            719.40        
    JOINT PAIN-UNSPEC                     

 

 2018            COLON DO, RACQUEL            Ot            720.2         
   SACROILIITIS NEC                     

 

 2018            COLON DO, RACQUEL            Ot            782.1         
   NONSPECIF SKIN ERUPT NEC                     

 

 2018            DARLENE DO, RACQUEL            Ot            R10.9         
   UNSPECIFIED ABDOMINAL PAIN                     

 

 2018            DARLENE PINTO, RACQUEL            Ot            R10.9         
   UNSPECIFIED ABDOMINAL PAIN                     

 

 2018            ILYA DO, JASON B            Ot            K29.70       
     GASTRITIS, UNSPECIFIED, WITHOUT BLEEDING                     

 

 05/10/2018            ILYA DO, JASON B            Ot            K29.70       
     GASTRITIS, UNSPECIFIED, WITHOUT BLEEDING                     

 

 05/15/2018            DARLENE PINTO, RACQUEL            Ot            R10.9         
   UNSPECIFIED ABDOMINAL PAIN                     

 

 2018            DARLENE PINTO, RACQUEL            Ot            R10.9         
   UNSPECIFIED ABDOMINAL PAIN                     

 

 2018            ILYA DO, JASON B            Ot            K82.8        
    OTHER SPECIFIED DISEASES OF GALLBLADDER                     

 

 2018            DARLENE PINTO, RACQUEL            Ot            R10.9         
   UNSPECIFIED ABDOMINAL PAIN                     

 

 2018            ILYA DO, JASON B            Ot            K82.8        
    OTHER SPECIFIED DISEASES OF GALLBLADDER                     

 

 2018            ILYA DO, JASON B            Ot            Z01.818      
      ENCOUNTER FOR OTHER PREPROCEDURAL EXAMIN                     

 

 2018            ILYA DO, JASON B            Ot            K82.8        
    OTHER SPECIFIED DISEASES OF GALLBLADDER                     

 

 2018            ILYA DO, JASON B            Ot            Z01.818      
      ENCOUNTER FOR OTHER PREPROCEDURAL EXAMIN                     



                                                                               
       



Procedures

      



There is no data.                  



Results

      





 Test            Result            Range        









 Complete blood count (CBC) with automated white blood cell (WBC) differential 
- 18 08:27         









 Blood leukocytes automated count (number/volume)            5.6 10*3/uL       
     4.3-11.0        

 

 Blood erythrocytes automated count (number/volume)            5.06 10*6/uL    
        4.35-5.85        

 

 Venous blood hemoglobin measurement (mass/volume)            15.2 g/dL        
    13.3-17.7        

 

 Blood hematocrit (volume fraction)            44 %            40-54        

 

 Automated erythrocyte mean corpuscular volume            86 [foz_us]          
  80-99        

 

 Automated erythrocyte mean corpuscular hemoglobin (mass per erythrocyte)      
      30 pg            25-34        

 

 Automated erythrocyte mean corpuscular hemoglobin concentration measurement (
mass/volume)            35 g/dL            32-36        

 

 Automated erythrocyte distribution width ratio            12.4 %            
10.0-14.5        

 

 Automated blood platelet count (count/volume)            246 10*3/uL          
  130-400        

 

 Automated blood platelet mean volume measurement            9.5 [foz_us]      
      7.4-10.4        

 

 Automated blood neutrophils/100 leukocytes            71 %            42-75   
     

 

 Automated blood lymphocytes/100 leukocytes            21 %            12-44   
     

 

 Blood monocytes/100 leukocytes            7 %            0-12        

 

 Automated blood eosinophils/100 leukocytes            0 %            0-10     
   

 

 Automated blood basophils/100 leukocytes            0 %            0-10        

 

 Blood neutrophils automated count (number/volume)            4.0 10*3         
   1.8-7.8        

 

 Blood lymphocytes automated count (number/volume)            1.2 10*3         
   1.0-4.0        

 

 Blood monocytes automated count (number/volume)            0.4 10*3            
0.0-1.0        

 

 Automated eosinophil count            0.0 10*3/uL            0.0-0.3        

 

 Automated blood basophil count (count/volume)            0.0 10*3/uL          
  0.0-0.1        









 Comprehensive metabolic panel - 18 08:27         









 Serum or plasma sodium measurement (moles/volume)            138 mmol/L       
     135-145        

 

 Serum or plasma potassium measurement (moles/volume)            4.0 mmol/L    
        3.6-5.0        

 

 Serum or plasma chloride measurement (moles/volume)            106 mmol/L     
               

 

 Carbon dioxide            23 mmol/L            21-32        

 

 Serum or plasma anion gap determination (moles/volume)            9 mmol/L    
        5-14        

 

 Serum or plasma urea nitrogen measurement (mass/volume)            13 mg/dL   
         7-18        

 

 Serum or plasma creatinine measurement (mass/volume)            0.85 mg/dL    
        0.60-1.30        

 

 Serum or plasma urea nitrogen/creatinine mass ratio            15             
NRG        

 

 Serum or plasma creatinine measurement with calculation of estimated 
glomerular filtration rate            >             NRG        

 

 Serum or plasma glucose measurement (mass/volume)            112 mg/dL        
            

 

 Serum or plasma calcium measurement (mass/volume)            9.8 mg/dL        
    8.5-10.1        

 

 Serum or plasma total bilirubin measurement (mass/volume)            1.1 mg/dL
            0.1-1.0        

 

 Serum or plasma alkaline phosphatase measurement (enzymatic activity/volume)  
          93 U/L                    

 

 Serum or plasma aspartate aminotransferase measurement (enzymatic activity/
volume)            15 U/L            5-34        

 

 Serum or plasma alanine aminotransferase measurement (enzymatic activity/volume
)            17 U/L            0-55        

 

 Serum or plasma protein measurement (mass/volume)            7.3 g/dL         
   6.4-8.2        

 

 Serum or plasma albumin measurement (mass/volume)            4.7 g/dL         
   3.2-4.5        









 Serum or plasma amylase measurement (enzymatic activity/volume) - 18 08:
27         









 Serum or plasma amylase measurement (enzymatic activity/volume)            64 U
/L                    









 Lipase - 18 08:27         









 Lipase            13 U/L            8-78        









 Erythrocyte sedimentation rate by westergren method - 18 08:27         









 Erythrocyte sedimentation rate by westergren method            2 mm            
0-15        









 Serum or plasma C reactive protein measurement (mass/volume) - 18 08:27 
        









 Serum or plasma C reactive protein measurement (mass/volume)            0.03 mg
/dL            0.00-0.50        









 Complete urinalysis with reflex to culture - 18 08:30         









 Urine color determination            YELLOW             NRG        

 

 Urine clarity determination            CLEAR             NRG        

 

 Urine pH measurement by test strip            6.5             5-9        

 

 Specific gravity of urine by test strip            1.010             1.016-
1.022        

 

 Urine protein assay by test strip, semi-quantitative            NEGATIVE      
       NEGATIVE        

 

 Urine glucose detection by automated test strip            NEGATIVE           
  NEGATIVE        

 

 Erythrocytes detection in urine sediment by light microscopy            1+    
         NEGATIVE        

 

 Urine ketones detection by automated test strip            3+             
NEGATIVE        

 

 Urine nitrite detection by test strip            NEGATIVE             NEGATIVE
        

 

 Urine total bilirubin detection by test strip            NEGATIVE             
NEGATIVE        

 

 Urine urobilinogen measurement by automated test strip (mass/volume)          
  NORMAL             NORMAL        

 

 Urine leukocyte esterase detection by dipstick            NEGATIVE             
NEGATIVE        

 

 Automated urine sediment erythrocyte count by microscopy (number/high power 
field)            RARE             NRG        

 

 Automated urine sediment leukocyte count by microscopy (number/high power field
)            NONE             NRG        

 

 Bacteria detection in urine sediment by light microscopy            NEGATIVE  
           NRG        

 

 Squamous epithelial cells detection in urine sediment by light microscopy     
       NONE             NRG        

 

 Crystals detection in urine sediment by light microscopy            NONE      
       NRG        

 

 Casts detection in urine sediment by light microscopy            NONE         
    NRG        

 

 Mucus detection in urine sediment by light microscopy            NEGATIVE     
        NRG        

 

 Complete urinalysis with reflex to culture            NO             NRG      
  



                            



Encounters

      





 ACCT No.            Visit Date/Time            Discharge            Status    
        Pt. Type            Provider            Facility            Loc./Unit  
          Complaint        

 

 [id number]            2018 05:24:00            2018 13:19:00    
        DIS            Outpatient            JASON CARABALLO DO            [facility]            PREOP            CHOLELITHIASIS        

 

 [id number]            2018 08:30:00            2018 11:05:00    
        DIS            Outpatient            JASON CARABALLO DO            [facility]            ENDO            FAMILY HX COLON CA     
   

 

 [id number]            2018 11:00:00            2018 11:48:00    
        DIS            Outpatient            JASON CARABALLO DO            [facility]            PREOP            COLONOSCOPY        

 

 [id number]            2018 11:56:00            2018 23:59:59    
        CLS            Outpatient            JASON CARABALLO DO            [facility]            CARD            ABD PAIN        

 

 [id number]            2018 10:28:00            2018 12:45:00    
        DIS            Outpatient            JASON CARABALLO DO            [facility]            ENDO            WT LOSS, ABD PAIN      
  

 

 [id number]            2018 07:51:00            2018 23:59:59    
        CLS            Outpatient            DARLENE PINTO RACQUEL            [facility]            RAD            R10.9 ABD PAIN        

 

 [id number]            2015 16:37:00            2015 23:59:59    
        CLS            Outpatient            COLON DO, RACQUEL            [facility]            RAD            SACRUM PAIN,BACK PAIN,SI 
JOINT PAIN,ANEMIA,PSORIAS        

 

 [id number]            2018 09:45:00                         PEN       
     Preadmit            JASON CARABALLO DO            [facility]            CHOLELITHIASIS        

 

 [id number]            07/10/2008 10:49:00                                   
   Document Registration                                                       
     

 

 [id number]            2015 16:40:13                         ACT            
Document Registration

## 2018-07-11 NOTE — XMS REPORT
Clinical Summary

 Created on: 2018



Oral Burkett

External Reference #: [id number]

: 1975

Sex: Male



Demographics







 Address  [address]

[place]  [address]

 

 Home Phone  [phone]

 

 Preferred Language  English

 

 Marital Status  Unknown

 

 Sikhism Affiliation  NON

 

 Race  White

 

 Ethnic Group  Not  or 





Author







 Author  [facility]

 

 Organization  [facility]

 

 Address  Unknown

 

 Phone  Unavailable







Support







 Name  Relationship  Address  Phone

 

 [address] 

[place]  [address]  [phone]







Care Team Providers







 Care Team Member Name  Role  Phone

 

 Merrill Jiang   PCP  [phone]

 

 Marco Yeboah MD  Unavailable  [phone]







Source Comments

Some departments are not documenting in the electronic medical record.  If you 
do not see the information that you expected, contact Release of Information in 
the Health Information Management department at [phone] for further 
assistance in locating additional records.[facility]



Allergies

No Known Allergies



Current Medications







      



  Prescription   Sig.   Disp.   Refills   Start   End Date   Status



      Date  

 

      



  morphine SR (MS CONTIN)   Take 2 Tabs by mouth   60   0   /20/20    Active



  15 mg tablet   Twice Daily.     08  

 

      



  senna/docusate   Take 1 Tab by mouth Every   60   0   07/20/20    Active



  (SENOKOT-S) 8.6/50 mg   12 Hours as needed for     08  



  tablet   constipation     

 

      



  oxycodone/acetaminophen   Take 1-2 Tabs by mouth   120   0   /20/20    
Active



  (PERCOCET) 5/325 mg   Every 4 Hours as needed     08  



  tablet   for Pain.     

 

      



  promethazine (PHENERGAN)   Take 1 Tab by mouth Every   50   0   /20/20    
Active



  25 mg tablet   6 Hours as needed for     08  



   Nausea.     







Active Problems







 



  Problem   Noted Date

 

 



  Fracture calcaneus-closed   2008







Social History







    



  Tobacco Use   Types   Packs/Day   Years Used   Date

 

    



  Never Smoker    









   



  Alcohol Use   Drinks/Week   oz/Week   Comments

 

   



  Yes   12 Cans of   144.0 



   beer  









 



  Sex Assigned at Birth   Date Recorded

 

 



  Not on file 







Last Filed Vital Signs







  



  Vital Sign   Reading   Time Taken

 

  



  Blood Pressure   125/75   2008  7:00 AM CDT

 

  



  Pulse   72   2008  7:00 AM CDT

 

  



  Temperature   36.7 C (98 F)   2008  7:00 AM CDT

 

  



  Respiratory Rate   -   -

 

  



  Oxygen Saturation   95%   2008  7:00 AM CDT

 

  



  Inhaled Oxygen   -   -



  Concentration  

 

  



  Weight   -   -

 

  



  Height   -   -

 

  



  Body Mass Index   -   -







Plan of Treatment







   



  Health Maintenance   Due Date   Last Done   Comments

 

   



  PHYSICAL (COMPREHENSIVE)   10/07/1981  



  EXAM   

 

   



  PERTUSSIS VACCINE   10/07/1985  

 

   



  HIV SCREENING   10/07/1989  

 

   



  TETANUS VACCINE   10/07/1991  

 

   



  INFLUENZA VACCINE   10/01/2018  







Results

Not on filefrom Last 3 Months

## 2018-07-11 NOTE — DISCHARGE INST-SURGICAL
Discharge Inst-Surgical


Depart Medication/Instructions


New, Converted or Re-Newed RX:  RX Given to Pt/Family


Patient Instructions


Follow up Appt:


Make appointment for 1 weeks.





Instructions:


No lifting greater than 10 pounds.


No strenuous activity. 


May shower in 24 hours, no tub bath or soaking.


Use incentive spirometer at home as directed.


No Smoking





Skin/Wound Care:


May remove bandages in am.  You need to leave the Dermabond on over incision it 

will fall off on its own. 





Symptoms to Report:


Appetite Changes, Extremity Discoloration, Numbness/Tingling, Swelling Increased

, Bleeding Excessive, Eyesight Changes, Pain Increased, Urine Color Change, 

Constipation(Persistent), Fever over 101 degree F, Pain/Pressure in chest, 

Urinating Difficulty, Cough Up/Vomit Blood, Heart Beat Irreg/Pounding, Pain/

Pressure in jaw,  Cramps in feet or legs, Lightheadedness, Pain/Pressure in 

shoulder, Diarrhea(Persistent), Memory Changes Suddenly, Questions/Concerns, 

Weight gain consecutive days, Dizziness/Fainting, Nausea/Vomiting, Shortness of 

Breath, Weight gain over 2 pounds.





If eyes or skin turn yellow notify physician.








If questions or concerns contact your physician 


Or seek help at emergency department.





Activity


Activity as Tolerated:  Yes


Activity Instructions:  Avoid Pulling & Pushing, Avoid Stress to Incision


Driving Instructions:  No Driving/Refer to 





Diet


Discharge Diet:  Avoid Fatty Foods, Low Fat/Low Cholesterol


Diet After 24 Hours:  Clear Liquid if Nauseous


If Any Problems/Questions/Issu:  Contact Your Physician, Go to Emergency Room





Skin/Wound Care


Infection Signs and Symptoms:  Increased Redness, Foul Odor of Wound, Increased 

Drainage, Skin Itchy or Has a Rash, Increased Swelling, Temperature Above 101  F


Wound Care Comment:  


Heating pad to shoulder and neck tonight for pain.


Bathing Instructions:  Shower


Stitches/Staples/Dermabond Dis:  Dermabond


Ice Pack:  Ice On and Off Site (as needed for pain)











JASON CARABALLO DO Jul 11, 2018 10:33

## 2018-07-11 NOTE — OPERATIVE REPORT
DATE OF SERVICE:  07/11/2018



PREOPERATIVE DIAGNOSIS:

Biliary dyskinesia.



POSTOPERATIVE DIAGNOSES:

Biliary dyskinesia, bilateral inguinal hernia with omentum stuck in the left

inguinal hernia.  They were indirect.



PROCEDURE:

Laparoscopic cholecystectomy with intraoperative cholangiogram.



SURGEON:

Chema Pemberton DO



FIRST ASSISTANT:

Geovani Mo DO



ANESTHESIA:

General endotracheal tube.



SPECIMEN:

Gallbladder and contents.



BLOOD LOSS:

Scant.



FLUIDS:

Per anesthesia.



POSTOPERATIVE CONDITION:

Stable.



INDICATION FOR PROCEDURE:

The patient is a 42-year-old male, who has been having right upper quadrant pain

as well as some heartburn and mid epigastric pain, had a HIDA scan, which showed

a 2% ejection fraction and needed his gallbladder removed.



FINDINGS:

The patient actually had almost a band looking like across the base of the

gallbladder.  A picture was taken of this, but no adhesions to the gallbladder

and common bile duct was open.



PROCEDURE NOTE:

After informed consent was obtained, the patient was brought to the operating

room, placed in supine position, sterilely prepped and draped in normal fashion.

 Local lidocaine was used to infiltrate the skin above the umbilicus.  Made

incision with #11 blade, carried down through skin and subcutaneous tissue, then

deepened down to subcutaneous tissue with Bovie electrocautery down to the

fascia.  Fascia incised with Bovie electrocautery.  Bluntly entered the abdomen,

swept the finger around, placed 0 Vicryl figure-of-eight suture, then placed 11

mm trocar port under direct visualization.  Created pneumoperitoneum.  Placed 3

more ports in normal fashion using local lidocaine, 11 blade for stab incision

and the VersaStep system, all done under direct visualization, one subxiphoid

and two in the right upper quadrant.  The patient then placed in reverse

Trendelenburg, rotated left, able to visualize the gallbladder, grasped at the

fundus, down at the base of the gallbladder there was almost like an indentation

like a band that had been holding, it was kind of an oddly shaped gallbladder,

took a picture of this.  Able to grasp down in the Bola's pouch, pulled in

inferolateral direction, started dissecting out the cystic duct and cystic

artery.  Cystic artery was on top.  I was able to get around this, clipped once

proximally and once distally and cut with Metzenbaum scissors.  Then, started

dissecting out cystic duct, able to get around the cystic duct, placed one clip

distally.  Cut senior living through Metzenbaum scissors.  Placed a cholangiogram

catheter and shot a cholangiogram.  Good spillage of dye down the common bile

duct and the small intestine as well as up in the common hepatic and right and

left hepatics.  Removed the cholangiogram catheter, placed 2 clips proximally on

the cystic duct and cut the cystic duct with Metzenbaum scissors.  Then started

removing the gallbladder from bed of liver with L-hook cautery.  Once it was

completely removed, switched to 5 mm camera, placed a bag in the abdomen, placed

the gallbladder in the bag and then removed this through the supraumbilical

incision.  Copiously irrigated with normal saline.  Hemostasis obtained in the

bed of liver with L-hook cautery.  Suctioned out all the fluid as well as

flushed a little bit, there had been a little bit of bile spillage, by making a

very tiny hole, the gallbladder was taken off the bed of liver.  Once this

removed, then elected to place the patient supine.  Suctioned out all fluid,

looked in the pelvis and found bilateral indirect inguinal hernias.  Pictures

taken.  On the left, there was some omentum stuck in the left one, but not

appear to be any intestine or any obstruction.  At this point, then removed all

ports under direct visualization, allowed pneumoperitoneum to escape.  Closed

the supraumbilical incision, closed the fascia with 0 Vicryl suture previously

placed.  Copiously irrigated all incisions with normal saline, closing the 3

small 5 mm incisions with single interrupted 4-0 undyed Monocryl subcuticular

stitch.  Closed the supraumbilical incision with 3 interrupted 4-0 undyed

Monocryl subcuticular stitches.  Area was cleaned and dried.  Dermabond placed

as well as a dressing.  The patient then transferred to recovery room in stable

condition.  Sponge, instrument and needle count correct at the end of the case.



Dr. Mo assisted by making incisions, holding the gallbladder, helping to 
identify

anatomy and closing the incisions.





Job ID: [id number]

DocumentID: [id number]

Dictated Date:  07/11/2018 10:46:30

Transcription Date: 07/11/2018 13:47:57

Dictated By: DO CORWIN GUNDERSON

## 2019-03-21 ENCOUNTER — HOSPITAL ENCOUNTER (EMERGENCY)
Dept: HOSPITAL 75 - ER | Age: 44
Discharge: HOME | End: 2019-03-21
Payer: COMMERCIAL

## 2019-03-21 VITALS — BODY MASS INDEX: 25.92 KG/M2 | WEIGHT: 175 LBS | HEIGHT: 69 IN

## 2019-03-21 VITALS — DIASTOLIC BLOOD PRESSURE: 88 MMHG | SYSTOLIC BLOOD PRESSURE: 148 MMHG

## 2019-03-21 DIAGNOSIS — Z98.890: ICD-10-CM

## 2019-03-21 DIAGNOSIS — Z87.19: ICD-10-CM

## 2019-03-21 DIAGNOSIS — S61.412A: Primary | ICD-10-CM
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
## 2019-03-21 DIAGNOSIS — K21.9: ICD-10-CM
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
## 2019-03-21 PROCEDURE — 12002 RPR S/N/AX/GEN/TRNK2.6-7.5CM: CPT

## 2019-03-21 PROCEDURE — 90715 TDAP VACCINE 7 YRS/> IM: CPT

## 2019-03-21 NOTE — XMS REPORT
Encounter Summary

 Created on: 2019



Oral Burkett

External Reference #: [id number]

: 1975

Sex: Male



Demographics







 Address  [address]

[place]  [address]

 

 Home Phone  [phone]

 

 Preferred Language  English

 

 Marital Status  Unknown

 

 Yazidi Affiliation  NON

 

 Race  White

 

 Ethnic Group  Not  or 





Author







 Author  [facility]

 

 Organization  [facility]

 

 Address  Unknown

 

 Phone  Unavailable







Support







 Name  Relationship  Address  Phone

 

 Madel[address]Cassandra  [address]

[place]  [address]  [phone]







Care Team Providers







 Care Team Member Name  Role  Phone

 

 Marco Yeboah MD  Unavailable  [phone]

 

 Vianey Stiles DO  PCP  [phone]







Encounter Details







    Care Team  Description



  Date   Type   Department  

 

    



Kaykay Lancaster, NAEL-NP



 [address]



[facility] [address]



[place], [place] [id number] [phone] (Fax)   



  2019   Prep for Case   [facility]  



     [address]  



    [address]  



    [place]  



    [address] [phone]  







Social History







     Date



  Tobacco Use   Types   Packs/Day   Years Used 

 

      



  Never Smoker    

 

    



  Smokeless Tobacco: Never   



  Used   









   



  Alcohol Use   Drinks/Week   oz/Week   Comments

 

   



  Yes   12 Cans of   144.0 



   beer  









 



  Sex Assigned at Birth   Date Recorded

 

 



  Not on file 









   Industry



  Job Start Date   Occupation 

 

   Not on file



  Not on file   Not on file 









   Travel End



  Travel History   Travel Start 













  No recent travel history available.



documented as of this encounter



Plan of Treatment





Not on filedocumented as of this encounter



Visit Diagnoses

Not on filedocumented in this encounter

## 2019-03-21 NOTE — XMS REPORT
Encounter Summary

 Created on: 2019



Oral Burkett

External Reference #: [id number]

: 1975

Sex: Male



Demographics







 Address  [address]

[place]  [address]

 

 Home Phone  [phone]

 

 Preferred Language  English

 

 Marital Status  Unknown

 

 Orthodox Affiliation  NON

 

 Race  White

 

 Ethnic Group  Not  or 





Author







 Author  [facility]

 

 Organization  [facility]

 

 Address  Unknown

 

 Phone  Unavailable







Support







 Name  Relationship  Address  Phone

 

 MadelaineCassandra  [address]

[place]  [address]  [phone]







Care Team Providers







 Care Team Member Name  Role  Phone

 

 Marco Yeboah MD  Unavailable  [phone]

 

 Vianey Stiles DO  PCP  [phone]







Encounter Details







    Care Team  Description



  Date   Type   Department  

 

    



Devin Iqbal MD



 [address]



[facility] [address]



[place] [address] [phone] (Fax)  Esophagitis with gastritis (Primary Dx); 

Dyspepsia; 

Bloating



  2019   Prep for Case   [facility]  



     [address]  



    [address]  



    [place]  



    [address] [phone]  







Social History







     Date



  Tobacco Use   Types   Packs/Day   Years Used 

 

      



  Never Smoker    

 

    



  Smokeless Tobacco: Never   



  Used   









   



  Alcohol Use   Drinks/Week   oz/Week   Comments

 

   



  Yes   12 Cans of   144.0 



   beer  









 



  Sex Assigned at Birth   Date Recorded

 

 



  Not on file 









   Industry



  Job Start Date   Occupation 

 

   Not on file



  Not on file   Not on file 









   Travel End



  Travel History   Travel Start 













  No recent travel history available.



documented as of this encounter



Plan of Treatment





Not on filedocumented as of this encounter



Visit Diagnoses











  Diagnosis

 





  Esophagitis with gastritis - Primary

 





  Dyspepsia



  Dyspepsia and other specified disorders of function of stomach

 





  Bloating



  Flatulence, eructation, and gas pain



documented in this encounter

## 2019-03-21 NOTE — ED UPPER EXTREMITY
General


Chief Complaint:  Laceration


Stated Complaint:  L HAND LAC


Nursing Triage Note:  


PT SENT FROM  WITH COMPLAINT OF LEFT HAND LACERTION. PT STATES HE WAS CUT BY 


AN IMPACT SOCKET. DENIES ANY OTHER INJURY.


Nursing Sepsis Screen:  No Definite Risk


Source:  patient


Exam Limitations:  no limitations





History of Present Illness


Date Seen by Provider:  Mar 21, 2019


Time Seen by Provider:  18:19


Initial Comments


To ER per private vehicle with reports of a laceration to the palm of the left 

hand. Tetanus is not up-to-date. This was minor impact socket at home in his 

garage.


Onset:  just prior to arrival


Severity:  moderate


Pain/Injury Location:  left hand


Modifying Factors:  Worse With Movement





Allergies and Home Medications


Allergies


Coded Allergies:  


     No Known Drug Allergies (Unverified , 7/6/18)





Home Medications


Hydrocodone Bit/Acetaminophen 1 Tab Tab, 1 TAB PO Q6H PRN


   Prescribed by: JASON CARABALLO on 7/11/18 1031


Pantoprazole Sodium 40 Mg Tablet.dr, 40 MG PO DAILY, (Reported)





Patient Home Medication List


Home Medication List Reviewed:  Yes





Review of Systems


Constitutional:  see HPI


EENTM:  see HPI


Respiratory:  no symptoms reported


Cardiovascular:  no symptoms reported


Genitourinary:  no symptoms reported


Musculoskeletal:  see HPI


Skin:  see HPI


Psychiatric/Neurological:  No Symptoms Reported





Past Medical-Social-Family Hx


Patient Social History


Alcohol Use:  Occasionally Uses


Number of Drinks Today:  AA


Alcohol Beverage of Choice:  Beer


Recreational Drug Use:  No


Smoking Status:  Never a Smoker


Recent Foreign Travel:  No


Contact w/Someone Who Travel:  No


Recent Infectious Disease Expo:  No


Recent Hopitalizations:  No





Immunizations Up To Date


Tetanus Booster (TDap):  More than 5yrs


PED Vaccines UTD:  Yes





Seasonal Allergies


Seasonal Allergies:  Yes (MILD)





Past Medical History


Surgeries:  Yes


Gallbladder


Respiratory:  No


Currently Using CPAP:  No


Currently Using BIPAP:  No


Cardiac:  No


Neurological:  No


Reproductive Disorders:  No


Sexually Transmitted Disease:  No


HIV/AIDS:  No


Gastrointestinal:  Yes


Gastroesophageal Reflux, Gall Bladder Disease


Musculoskeletal:  No


Endocrine:  No


Loss of Vision:  Denies


Hearing Impairment:  Denies


Cancer:  No


Psychosocial:  No


Integumentary:  Yes


Psoriasis


Blood Disorders:  No


Adverse Reaction/Blood Tranf:  No (N/A)





Family Medical History





Malignant neoplasm of digestive organs





Physical Exam


Vital Signs





Vital Signs - First Documented








 3/21/19





 17:59


 


Pulse 81


 


Resp 20


 


B/P (MAP) 148/88 (108)


 


Pulse Ox 98


 


O2 Delivery Room Air





Capillary Refill : Less Than 3 Seconds


Height, Weight, BMI


Height: 5'9.00"


Weight: 175lbs. 0.0oz. 79.710535gf; 24.1 BMI


Method:Stated


General Appearance:  WD/WN, no apparent distress


HEENT:  PERRL/EOMI


Respiratory:  no respiratory distress, no accessory muscle use


Gastrointestinal:  normal bowel sounds, soft


Shoulder:  normal inspection, non-tender


Elbow/Forearm:  normal inspection, non-tender


Hand:  Left, laceration (for semi-laceration to the palm of left hand oriented 

in a radial to ulnar fashion. Depth is to the subcutaneous cutaneous tissue. He 

retained the ability to flex the middle pointer finger and thumb. He has some 

reduced sensation to the tip of the left pointer finger but also has a small 

laceration to this area.)


Neurologic/Tendon:  normal sensation, normal motor functions


Neurologic/Psychiatric:  alert, normal mood/affect, oriented x 3


Skin:  normal color, warm/dry





Procedures/Interventions





   Wound Location:  Upper Extremities


   Wound Length (cm):  4


   Wound's Depth, Shape:  linear


   Wound Explored:  clean


   Irrigated w/ Saline (ccs):  500


   Anesthesia:  1% Lidocaine


   Volume Anesthetic (ccs):  3


   Suture:  Prolene


   Suture Size:  5-0


   Number of Sutures:  6


   Layer Closure?:  1


   Number Deep Layer Sutures:  0


Progress


Anesthesia was achieved by doing a median nerve block using 3 mL of 1% 

lidocaine without epinephrine. This achieved wonderfully anesthesia. The wound 

was then scrubbed with chlorhexidine/saline solution and irrigated with 400 mL 

of saline solution. No foreign bodies were identified. The visualized flexor 

tendon injury. He  maintains flexion ability





Progress/Results/Core Measures


Results/Orders


My Orders





Orders - ELISE JARRETT


Lidocaine 1% Inj 20 Ml (Xylocaine 1% Inj (3/21/19 18:15)





Vital Signs/I&O











 3/21/19





 17:59


 


Pulse 81


 


Resp 20


 


B/P (MAP) 148/88 (108)


 


Pulse Ox 98


 


O2 Delivery Room Air














Blood Pressure Mean:  108











Departure


Impression





 Primary Impression:  


 Hand laceration


 Qualified Codes:  S61.412A - Laceration without foreign body of left hand, 

initial encounter


Disposition:  01 HOME, SELF-CARE


Condition:  Stable





Departure-Patient Inst.


Decision time for Depature:  18:44


Referrals:  


RACQUEL COLON DO (PCP/Family)


Primary Care Physician


Patient Instructions:  Laceration Repair With Stitches (DC)





Add. Discharge Instructions:  


1. Stitches out in about 10 days. Keep an eye on this for any sign of infection 

such as redness or swelling or pus like drainage. Keep the bandage over this 

for about the next 5 days. Change it daily. You may shower leading water run 

over this. Take Anaprox as directed. All discharge instructions reviewed with 

patient and/or family. Voiced understanding.


Scripts


Cephalexin (Keflex) 500 Mg Capsule


500 MG PO TID, #21 CAP


   Prov: ELISE JARRETT APRHEATHER         3/21/19











ELISE JARRETT Mar 21, 2019 18:22

## 2019-03-21 NOTE — XMS REPORT
Clinical Summary

 Created on: 2019



Oral Burkett

External Reference #: [id number]

: 1975

Sex: Male



Demographics







 Address  [address]

[place]  [address]

 

 Home Phone  [phone]

 

 Preferred Language  English

 

 Marital Status  Unknown

 

 Yarsani Affiliation  NON

 

 Race  White

 

 Ethnic Group  Not  or 





Author







 Author  [facility]

 

 Organization  [facility]

 

 Address  Unknown

 

 Phone  Unavailable







Support







 Name  Relationship  Address  Phone

 

 Cassandra Burkett  [address]  [address]

[place]  [address]  [phone]







Care Team Providers







 Care Team Member Name  Role  Phone

 

 Marco Yeboah MD  Unavailable  [phone]

 

 Vianey Stiles DO  PCP  [phone]







Source Comments

Some departments are not documenting in the electronic medical record.  If you 
do not see the information that you expected, contact Release of Information in 
the Health Information Management department at [phone] for further 
assistance in locating additional records.[facility]



Allergies

No Known Allergies



Medications







      End Date  Status



  Medication   Sig   Dispensed   Refills   Start  



      Date  

 

         Active



  amitriptyline (ELAVIL) 10   Take 10 mg by    0   /201  



  mg tablet   mouth daily.     8  

 

         Active



  ALPRAZolam (XANAX) 0.25   Take 0.25 mg    0   



  mg tablet   by mouth at     



   bedtime as     



   needed for     



   Anxiety.     







Active Problems







 



  Problem   Noted Date

 

 



  Gastroesophageal reflux disease without esophagitis   10/02/2018

 

 



  Belching   10/02/2018

 

 



  Fracture calcaneus-closed   2008







Encounters







    Care Team  Description



  Date   Type   Specialty  

 

    



Devin Iqbal MD  Bloating; 

Dyspepsia; 

Esophagitis



  2019   Office Visit   Gastroenterology  

 

    



Devin Iqbal MD  Esophagitis with gastritis (Primary Dx); 

Dyspepsia; 

Bloating



  2019   Prep for Case   Gastroenterology  

 

    



Kaykay Lancaster APRN-NP   



  2019   Prep for Case   Gastroenterology  

 

    



Devin Iqbal MD  Error



  2018   Telephone   Gastroenterology  



from Last 3 Months



Family History







   



  Medical History   Relation   Name   Comments

 

   



  None Reported   Father  

 

   



  Hypertension   Mother  

 

   



  Polymyalgia rheumatica   Mother  









   



  Relation   Name   Status   Comments

 

   



  Father    Alive 

 

   



  Mother    Alive 







Social History







     Date



  Tobacco Use   Types   Packs/Day   Years Used 

 

      



  Never Smoker    

 

    



  Smokeless Tobacco: Never   



  Used   









   



  Alcohol Use   Drinks/Week   oz/Week   Comments

 

   



  Yes   12 Cans of   144.0 



   beer  









 



  Sex Assigned at Birth   Date Recorded

 

 



  Not on file 









   Industry



  Job Start Date   Occupation 

 

   Not on file



  Not on file   Not on file 









   Travel End



  Travel History   Travel Start 













  No recent travel history available.







Last Filed Vital Signs







   Time Taken



  Vital Sign   Reading 

 

   2019  8:09 AM CST



  Blood Pressure   155/89 

 

   2019  8:09 AM CST



  Pulse   75 

 

   2019  8:09 AM CST



  Temperature   36.9 C (98.4 F) 

 

   2018  8:22 AM CST



  Respiratory Rate   16 

 

   10/02/2018  8:49 AM CDT



  Oxygen Saturation   100% 

 

   -



  Inhaled Oxygen   - 



  Concentration  

 

   2019  8:09 AM CST



  Weight   83 kg (183 lb) 

 

   2019  8:09 AM CST



  Height   175.3 cm (5' 9") 

 

   2019  8:09 AM CST



  Body Mass Index   27.02 







Plan of Treatment







   



  Health Maintenance   Due Date   Last Done   Comments

 

   



  PHYSICAL (COMPREHENSIVE)   10/07/1981  



  EXAM   

 

   



  HIV SCREENING   10/07/1989  

 

   



  DTAP/TDAP VACCINES (1 -   10/07/1992  



  Tdap)   

 

   



  INFLUENZA VACCINE   2018  







Results

Not on filefrom Last 3 Months



Insurance







       Type



  Payer   Benefit   Subscriber ID   Effective   Phone   Address 



   Plan /    Dates   



   Group     

 

       HMO



  [employer]   CIGNA NON   xxxxxxxxxxx   2018-P   



   PPO/EPO    resent   









     



  Guarantor Name   Account   Relation to   Date of   Phone   Billing Address



   Type   Patient   Birth  

 

     



  Oral Burkett   Personal/F   Self   1975   [phone]   [address]



   [facility]     (Home)   [place] [address] [phone] 



      (Work) 







Advance Directives





Patient has advance care planning documents on file. For more information, 
please contact:



[facility]



[address]



[place] [address]

## 2019-03-21 NOTE — XMS REPORT
Continuity of Care Document

 Created on: 2019



NICHOLAS ORLANDO

External Reference #: [id number]

: 1975

Sex: Male



Demographics







 Address  [address] [place] [address]

 

 Home Phone  [phone] x

 

 Preferred Language  Unknown

 

 Marital Status  Unknown

 

 Adventist Affiliation  Unknown

 

 Race  Unknown

 

 Ethnic Group  Unknown





Author







 Author  [facility]

 

 Organization  [facility]

 

 Address  Unknown

 

 Phone  Unavailable



              



Allergies

      





 Active            Description            Code            Type            
Severity            Reaction            Onset            Reported/Identified   
         Relationship to Patient            Clinical Status        

 

 Yes            No Known Drug Allergies            [id number]            Drug 
Allergy            Unknown            N/A                         2018   
                               



                  



Medications

      



There is no data.                  



Problems

      





 Date Dx Coded            Attending            Type            Code            
Diagnosis            Diagnosed By        

 

 2015            COLON DO, RACQUEL            Ot            285.9         
                         

 

 2015            COLON DO, RACQUEL            Ot            696.1         
                         

 

 2015            COLON DO, RACQUEL            Ot            719.40        
                          

 

 2015            COLON DO, RACQUEL            Ot            720.2         
                         

 

 2015            COLON DO, RACQUEL            Ot            782.1         
                         

 

 2015                         Ot            825.0                        
          

 

 2015                         Ot            E849.0                       
           

 

 2015                         Ot            E888.9                       
           

 

 2015            COLON DO, RACQUEL            Ot            285.9         
                         

 

 2015            COLON DO, RACQUEL            Ot            696.1         
                         

 

 2015            COLON DO, RACQUEL            Ot            719.40        
                          

 

 2015            COLON DO, RACQUEL            Ot            720.2         
                         

 

 2015            COLON DO, RACQUEL            Ot            782.1         
                         

 

 2015            COLON DO, RACQUEL            Ot            285.9         
                         

 

 2015            COLON DO, RACQUEL            Ot            696.1         
                         

 

 2015            COLON DO, RACQUEL            Ot            719.40        
                          

 

 2015            COLON DO, RACQUEL            Ot            720.2         
                         

 

 2015            COLON DO, RACQUEL            Ot            782.1         
                         

 

 2018            COLON DO, RACQUEL            Ot            285.9         
   ANEMIA NOS                     

 

 2018            COLON DO, RACQUEL            Ot            696.1         
   OTHER PSORIASIS                     

 

 2018            COLON DO, RACQUEL            Ot            719.40        
    JOINT PAIN-UNSPEC                     

 

 2018            COLON DO, RACQUEL            Ot            720.2         
   SACROILIITIS NEC                     

 

 2018            COLON DO, RACQUEL            Ot            782.1         
   NONSPECIF SKIN ERUPT NEC                     

 

 2018            COLON DO, RACQUEL            Ot            R10.9         
   UNSPECIFIED ABDOMINAL PAIN                     

 

 2018            COLON DO, RACQUEL            Ot            R10.9         
   UNSPECIFIED ABDOMINAL PAIN                     

 

 2018            ILYA PINTO, JASON B            Ot            K29.70       
     GASTRITIS, UNSPECIFIED, WITHOUT BLEEDING                     

 

 05/10/2018            ILYA DO, JASON B            Ot            K29.70       
     GASTRITIS, UNSPECIFIED, WITHOUT BLEEDING                     

 

 05/15/2018            COLON DO, RACQUEL            Ot            R10.9         
   UNSPECIFIED ABDOMINAL PAIN                     

 

 2018            COLON DO, RACUQEL            Ot            R10.9         
   UNSPECIFIED ABDOMINAL PAIN                     

 

 2018            ILYA PINTO JASON B            Ot            Z01.818      
      ENCOUNTER FOR OTHER PREPROCEDURAL EXAMIN                     

 

 2018            ILYA PINTO JASON B            Ot            K82.8        
    OTHER SPECIFIED DISEASES OF GALLBLADDER                     

 

 2018            DARLENE PINTO, RACQUEL            Ot            R10.9         
   UNSPECIFIED ABDOMINAL PAIN                     

 

 2018            ILYA PINTO JASON B            Ot            K82.8        
    OTHER SPECIFIED DISEASES OF GALLBLADDER                     

 

 2018            ILYA PINTO JASON B            Ot            K21.9        
    GASTRO-ESOPHAGEAL REFLUX DISEASE WITHOUT                     

 

 2018            ILYA PINTO, JASON B            Ot            K52.9        
    NONINFECTIVE GASTROENTERITIS AND COLITIS                     

 

 2018            ILYA PINTO JASON B            Ot            K64.8        
    OTHER HEMORRHOIDS                     

 

 2018            ILYA PINTO JASON B            Ot            R19.5        
    OTHER FECAL ABNORMALITIES                     

 

 2018            ILYA PINTO JASON B            Ot            Z80.0        
    FAMILY HISTORY OF MALIGNANT NEOPLASM OF                      

 

 2018            ILYA PINTO JASON B            Ot            Z01.818      
      ENCOUNTER FOR OTHER PREPROCEDURAL EXAMIN                     

 

 2018            ILYA PINTO JASON B            Ot            K82.8        
    OTHER SPECIFIED DISEASES OF GALLBLADDER                     

 

 2018            ILYA PINTO JASON B            Ot            Z01.818      
      ENCOUNTER FOR OTHER PREPROCEDURAL EXAMIN                     

 

 2018            ILYA PINTO JASON B            Ot            K82.8        
    OTHER SPECIFIED DISEASES OF GALLBLADDER                     

 

 2018            ILYA PINTO JASON B            Ot            Z01.818      
      ENCOUNTER FOR OTHER PREPROCEDURAL EXAMIN                     

 

 07/10/2018            ILYA PINTO JASON B            Ot            K82.8        
    OTHER SPECIFIED DISEASES OF GALLBLADDER                     

 

 2018            DARLENE PINTO RACQUEL            Ot            R10.9         
   UNSPECIFIED ABDOMINAL PAIN                     

 

 2018            JASON CARABALLO DO            Ot            K82.8        
    OTHER SPECIFIED DISEASES OF GALLBLADDER                     

 

 2018            JASON CARABALLO DO            Ot            K40.20       
     BI INGUINAL HERNIA, W/O OBST OR GANGRENE                     

 

 2018            JASON CARABALLO DO B            Ot            K81.1        
    CHRONIC CHOLECYSTITIS                     

 

 2018            JOHANNY CARABALLO DOIC JAYJAY            Ot            K40.20       
     BI INGUINAL HERNIA, W/O OBST OR GANGRENE                     

 

 2018            JOHANNY CARABALLO DOIC JAYJAY            Ot            K81.1        
    CHRONIC CHOLECYSTITIS                     

 

 2018            JASON CARABALLO DO            Ot            K40.20       
     BI INGUINAL HERNIA, W/O OBST OR GANGRENE                     

 

 2018            JOHANNY CARABALLO DOIC JAYJAY            Ot            K81.1        
    CHRONIC CHOLECYSTITIS                     



                                                                               
                                         



Procedures

      



There is no data.                  



Results

      





 Test            Result            Range        









 Complete blood count (CBC) with automated white blood cell (WBC) differential 
- 18 08:27         









 Blood leukocytes automated count (number/volume)            5.6 10*3/uL       
     4.3-11.0        

 

 Blood erythrocytes automated count (number/volume)            5.06 10*6/uL    
        4.35-5.85        

 

 Venous blood hemoglobin measurement (mass/volume)            15.2 g/dL        
    13.3-17.7        

 

 Blood hematocrit (volume fraction)            44 %            40-54        

 

 Automated erythrocyte mean corpuscular volume            86 [foz_us]          
  80-99        

 

 Automated erythrocyte mean corpuscular hemoglobin (mass per erythrocyte)      
      30 pg            25-34        

 

 Automated erythrocyte mean corpuscular hemoglobin concentration measurement (
mass/volume)            35 g/dL            32-36        

 

 Automated erythrocyte distribution width ratio            12.4 %            
10.0-14.5        

 

 Automated blood platelet count (count/volume)            246 10*3/uL          
  130-400        

 

 Automated blood platelet mean volume measurement            9.5 [foz_us]      
      7.4-10.4        

 

 Automated blood neutrophils/100 leukocytes            71 %            42-75   
     

 

 Automated blood lymphocytes/100 leukocytes            21 %            12-44   
     

 

 Blood monocytes/100 leukocytes            7 %            0-12        

 

 Automated blood eosinophils/100 leukocytes            0 %            0-10     
   

 

 Automated blood basophils/100 leukocytes            0 %            0-10        

 

 Blood neutrophils automated count (number/volume)            4.0 10*3         
   1.8-7.8        

 

 Blood lymphocytes automated count (number/volume)            1.2 10*3         
   1.0-4.0        

 

 Blood monocytes automated count (number/volume)            0.4 10*3            
0.0-1.0        

 

 Automated eosinophil count            0.0 10*3/uL            0.0-0.3        

 

 Automated blood basophil count (count/volume)            0.0 10*3/uL          
  0.0-0.1        









 Comprehensive metabolic panel - 18 08:27         









 Serum or plasma sodium measurement (moles/volume)            138 mmol/L       
     135-145        

 

 Serum or plasma potassium measurement (moles/volume)            4.0 mmol/L    
        3.6-5.0        

 

 Serum or plasma chloride measurement (moles/volume)            106 mmol/L     
               

 

 Carbon dioxide            23 mmol/L            21-32        

 

 Serum or plasma anion gap determination (moles/volume)            9 mmol/L    
        5-14        

 

 Serum or plasma urea nitrogen measurement (mass/volume)            13 mg/dL   
         7-18        

 

 Serum or plasma creatinine measurement (mass/volume)            0.85 mg/dL    
        0.60-1.30        

 

 Serum or plasma urea nitrogen/creatinine mass ratio            15             
NRG        

 

 Serum or plasma creatinine measurement with calculation of estimated 
glomerular filtration rate            >             NRG        

 

 Serum or plasma glucose measurement (mass/volume)            112 mg/dL        
            

 

 Serum or plasma calcium measurement (mass/volume)            9.8 mg/dL        
    8.5-10.1        

 

 Serum or plasma total bilirubin measurement (mass/volume)            1.1 mg/dL
            0.1-1.0        

 

 Serum or plasma alkaline phosphatase measurement (enzymatic activity/volume)  
          93 U/L                    

 

 Serum or plasma aspartate aminotransferase measurement (enzymatic activity/
volume)            15 U/L            5-34        

 

 Serum or plasma alanine aminotransferase measurement (enzymatic activity/volume
)            17 U/L            0-55        

 

 Serum or plasma protein measurement (mass/volume)            7.3 g/dL         
   6.4-8.2        

 

 Serum or plasma albumin measurement (mass/volume)            4.7 g/dL         
   3.2-4.5        









 Serum or plasma amylase measurement (enzymatic activity/volume) - 18 08:
27         









 Serum or plasma amylase measurement (enzymatic activity/volume)            64 U
/L                    









 Lipase - 18 08:27         









 Lipase            13 U/L            8-78        









 Erythrocyte sedimentation rate by westergren method - 18 08:27         









 Erythrocyte sedimentation rate by westergren method            2 mm            
0-15        









 Serum or plasma C reactive protein measurement (mass/volume) - 18 08:27 
        









 Serum or plasma C reactive protein measurement (mass/volume)            0.03 mg
/dL            0.00-0.50        









 Complete urinalysis with reflex to culture - 18 08:30         









 Urine color determination            YELLOW             NRG        

 

 Urine clarity determination            CLEAR             NRG        

 

 Urine pH measurement by test strip            6.5             5-9        

 

 Specific gravity of urine by test strip            1.010             1.016-
1.022        

 

 Urine protein assay by test strip, semi-quantitative            NEGATIVE      
       NEGATIVE        

 

 Urine glucose detection by automated test strip            NEGATIVE           
  NEGATIVE        

 

 Erythrocytes detection in urine sediment by light microscopy            1+    
         NEGATIVE        

 

 Urine ketones detection by automated test strip            3+             
NEGATIVE        

 

 Urine nitrite detection by test strip            NEGATIVE             NEGATIVE
        

 

 Urine total bilirubin detection by test strip            NEGATIVE             
NEGATIVE        

 

 Urine urobilinogen measurement by automated test strip (mass/volume)          
  NORMAL             NORMAL        

 

 Urine leukocyte esterase detection by dipstick            NEGATIVE             
NEGATIVE        

 

 Automated urine sediment erythrocyte count by microscopy (number/high power 
field)            RARE             NRG        

 

 Automated urine sediment leukocyte count by microscopy (number/high power field
)            NONE             NRG        

 

 Bacteria detection in urine sediment by light microscopy            NEGATIVE  
           NRG        

 

 Squamous epithelial cells detection in urine sediment by light microscopy     
       NONE             NRG        

 

 Crystals detection in urine sediment by light microscopy            NONE      
       NRG        

 

 Casts detection in urine sediment by light microscopy            NONE         
    NRG        

 

 Mucus detection in urine sediment by light microscopy            NEGATIVE     
        NRG        

 

 Complete urinalysis with reflex to culture            NO             NRG      
  









 Methicillin resistant Staphylococcus aureus (MRSA) screening culture -  08:45         









 Methicillin resistant Staphylococcus aureus (MRSA) screening culture          
  NEG             NRG        



                              



Encounters

      





 ACCT No.            Visit Date/Time            Discharge            Status    
        Pt. Type            Provider            Facility            Loc./Unit  
          Complaint        

 

 [id number]            2018 08:16:00            2018 13:20:00    
        DIS            Outpatient            JASON CARABALLO DO            [facility]            SDC            CHOLELITHIASIS        

 

 [id number]            2018 05:24:00            2018 13:19:00    
        DIS            Outpatient            JASON CARABALLO DO            [facility]            PREOP            CHOLELITHIASIS        

 

 [id number]            2018 08:30:00            2018 11:05:00    
        DIS            Outpatient            JASON CARABALLO DO            [facility]            ENDO            FAMILY HX COLON CA     
   

 

 [id number]            2018 11:00:00            2018 11:48:00    
        DIS            Outpatient            JASON CARABALLO DO            [facility]            PREOP            COLONOSCOPY        

 

 [id number]            2018 11:56:00            2018 23:59:59    
        CLS            Outpatient            JASON CARABALLO DO            [facility]            CARD            ABD PAIN        

 

 [id number]            2018 10:28:00            2018 12:45:00    
        DIS            Outpatient            JASON CARABALLO DO            [facility]            ENDO            WT LOSS, ABD PAIN      
  

 

 [id number]            2018 07:51:00            2018 23:59:59    
        CLS            Outpatient            DARLENE PINTO RACQUEL            [facility]            RAD            R10.9 ABD PAIN        

 

 [id number]            2015 16:37:00            2015 23:59:59    
        CLS            Outpatient            DARLENE PINTO RACQUEL            [facility]            RAD            SACRUM PAIN,BACK PAIN,SI 
JOINT PAIN,ANEMIA,PSORIAS        

 

 [id number]            07/10/2008 10:49:00                                   
   Document Registration                                                       
     

 

 [id number]            2015 16:40:13                         [place]            
Document Registration

## 2019-03-21 NOTE — XMS REPORT
Encounter Summary

 Created on: 2019



Oral Burkett

External Reference #: [id number]

: 1975

Sex: Male



Demographics







 Address  [address]

[place]  [address]

 

 Home Phone  [phone]

 

 Preferred Language  English

 

 Marital Status  Unknown

 

 Congregation Affiliation  NON

 

 Race  White

 

 Ethnic Group  Not  or 





Author







 Author  [facility]

 

 Organization  [facility]

 

 Address  Unknown

 

 Phone  Unavailable







Support







 Name  Relationship  Address  Phone

 

 Ganesh Burkett[address]  [address]  [address]

[place]  [id number]  [phone]







Care Team Providers







 Care Team Member Name  Role  Phone

 

 Marco Yeboah MD  Unavailable  [phone]

 

 Vianey Stiles DO  PCP  [phone]







Reason for Visit

* 





 



  Reason   Comments

 

 



  Error 









Encounter Details







    Care Team  Description



  Date   Type   Department  

 

    



Devin Iqbal MD



 [address]



[facility] [address]



[place] [address] [phone] (Fax)  Error



  2018   Telephone   [facility]  



     [address]  



    [address]  



    [place]  



    [address] [phone]  







Social History







     Date



  Tobacco Use   Types   Packs/Day   Years Used 

 

      



  Never Smoker    

 

    



  Smokeless Tobacco: Never   



  Used   









   



  Alcohol Use   Drinks/Week   oz/Week   Comments

 

   



  Yes   12 Cans of   144.0 



   beer  









 



  Sex Assigned at Birth   Date Recorded

 

 



  Not on file 









   Industry



  Job Start Date   Occupation 

 

   Not on file



  Not on file   Not on file 









   Travel End



  Travel History   Travel Start 













  No recent travel history available.



documented as of this encounter



Plan of Treatment





Not on filedocumented as of this encounter



Visit Diagnoses

Not on filedocumented in this encounter

## 2019-03-21 NOTE — XMS REPORT
Encounter Summary

 Created on: 2019



Oral Burkett

External Reference #: [id number]

: 1975

Sex: Male



Demographics







 Address  [address]

[place]  [address]

 

 Home Phone  [phone]

 

 Preferred Language  English

 

 Marital Status  Unknown

 

 Ep[employer] Affiliation  NON

 

 Race  White

 

 Ethnic Group  Not  or 





Author







 Author  [facility]

 

 Organization  [facility]

 

 Address  Unknown

 

 Phone  Unavailable







Support







 Name  Relationship  Address  Phone

 

 Cassandra Burkett  [address]  [address]

[place]  [address]  [phone]







Care Team Providers







 Care Team Member Name  Role  Phone

 

 Marco Yeboah MD  Unavailable  [phone]

 

 Vianey Stiles DO  PCP  [phone]







Reason for Referral

* Consult, Test & Treat (Routine)





     Referred By Contact  Referred To Contact



  Status   Reason   Specialty   Diagnoses /  



     Procedures  

 

     



Devin Iqbal MD



 [address]



[facility] [address] [facility]



[place] [address]



Phone: [phone]



Fax: [phone]  



Devin Iqbal MD



 [address]



[facility]



[place] [address]



Phone: [phone]



Fax: [phone]



  Closed   Specialty Services   Gastroenterology   Diagnoses  



   Required    Bloating  



     Dyspepsia  



     Esophagitis  







* Radiology Services (Routine)





     Referred By Contact  Referred To Contact



  Status   Reason   Specialty   Diagnoses /  



     Procedures  

 

     



Devin Iqbal MD



 [address]



[facility]



[place] [address]



Phone: [phone]



Fax: [phone]  



[facility]



[address] [address]



Phone: [phone]



Fax: [phone]



  No Auth Needed    Radiology   Diagnoses  



     Bloating  



     Dyspepsia  



     Esophagitis

  



     P  



     rocedures  



     NM GASTRIC  



     EMPTYING TIME  







* Consult, Test & Treat (Routine)





     Referred By Contact  Referred To Contact



  Status   Reason   Specialty   Diagnoses /  



     Procedures  

 

     



Devin Iqbal MD



 [address]



[facility]



[place] [address]



Phone: [phone]



Fax: [phone]  



[facility]



 [facility]



[address]



[place] [address]



Phone: [phone]



Fax: [phone]



  Closed   Specialty Services   Gastroenterology   Diagnoses  



   Required    Bloating  



     Dyspepsia  



     Esophagitis  











Reason for Visit

* 





 



  Reason   Comments

 

 



  GI Problem   Dyspepsia









Encounter Details







    Care Team  Description



  Date   Type   Department  

 

    



Devin Iqbal MD



 [address] [facility]



[facility]



[place] [address] [phone] (Fax)  Bloating; 

Dyspepsia; 

Esophagitis



  2019   Office Visit   [facility]  



    [address]  



    [address] [phone]  







Social History







     Date



  Tobacco Use   Types   Packs/Day   Years Used 

 

      



  Never Smoker    

 

    



  Smokeless Tobacco: Never   



  Used   









   



  Alcohol Use   Drinks/Week   oz/Week   Comments

 

   



  Yes   12 Cans of   144.0 



   beer  









 



  Sex Assigned at Birth   Date Recorded

 

 



  Not on file 









   Industry



  Job Start Date   Occupation 

 

   Not on file



  Not on file   Not on file 









   Travel End



  Travel History   Travel Start 













  No recent travel history available.



documented as of this encounter



Last Filed Vital Signs







   Time Taken



  Vital Sign   Reading 

 

   2019  8:09 AM CST



  Blood Pressure   155/89 

 

   2019  8:09 AM CST



  Pulse   75 

 

   2019  8:09 AM CST



  Temperature   36.9 C (98.4 F) 

 

   -



  Respiratory Rate   - 

 

   -



  Oxygen Saturation   - 

 

   -



  Inhaled Oxygen   - 



  Concentration  

 

   2019  8:09 AM CST



  Weight   83 kg (183 lb) 

 

   2019  8:09 AM CST



  Height   175.3 cm (5' 9") 

 

   2019  8:09 AM CST



  Body Mass Index   27.02 



documented in this encounter



Patient Instructions

* Patient Instructions* 



 Erika Reardon RN - 2019  8:00 AM CST



* We have placed a referral to the dietician for bloating

* We have ordered a gastric emptying test. You will be called by the [facility] 
[facility]

* We have ordered an EGD and you may schedule this at check out today. You have 
been provided written prep instructions.  Please go to [email or web address] to 
complete your online health history as soon as possible, you have been provided 
written instructions for this as well. If you need to reschedule this procedure 
please call [phone].



Please call Dr. Iqbal's nurse at [phone] if you have any questions or 
concerns.

General Instructions:

 To have a medication refilled: Please use the [facility] Refill request or 
contact your pharmacy directly to request medication refills. Please allow 72 
hours.

 [facility] is on the 1st floor. It is open from 7 am-6pm  
Tuesday-Friday and 6:30am-7pm on , and 7 am - Noon on  

 [facility] Lab is located on the 2nd floor and is open 8 am-5 pm Monday-Friday

 [facility] lab is located next to the check out desk and is open from 8 
AM to  4:45 PM Monday through Friday. 

 Radiology is on the 2nd floor of the Medical Office Building and the 2nd 
Floor of [facility]. Radiology Scheduling can be reached at [phone]

 To Schedule office visits: Call [phone]. 

 For procedure scheduling questions at the [facility] or [facility] 
please call [phone]; for a procedure at [facility] please call [phone]
[phone].

 To receive appointment reminders on your cell phone: Make sure we have your 
cell phone number, and Text [facility] to [phone].

 Support for many chronic illnesses is available through [facility]: 
[facility] or 080-876-3499.

 For urgent questions on nights, weekends or holidays, call the  at 
[phone], and ask for the doctor on call for Gastroenterology.Call [phone] 
for any emergencies.







Electronically signed by Erika Reardon RN at 2019  9:06 AM CST





documented in this encounter



Progress Notes

* Devin Iqbal MD - 2019  8:00 AM CST



Formatting of this note might be different from the original.

Date of Service: 2019



Subjective:        

 

Oral Burkett is a 43 y.o. male.



History of Present IllnessFollow-up visit after EGD/biopsy.

History of heartburn, dyspepsia, functional abdominal pain.



EGD showed LA grade B esophagitis, patient was instructed to take Nexium 20 mg 
p.o. twice daily since 2018.

Prior to that he has been on Dexilant for several months, with no significant 
improvement, has to stop.



Still complaining of occasional abdominal pain, cramps, increased belching, gas/
bloating.



Patient is adamant to PPI, follow antireflux diet.

We discussed last time the patient about aerophagia, and diaphragmatic breathing
, overtly he did not follow instruction.



Past Medical History: 

Diagnosis Date 

 GERD (gastroesophageal reflux disease)  

 Heel bone fracture  



Past Surgical History: 

Procedure Laterality Date 

 HX CHOLECYSTECTOMY  2018 

 HM COLONOSCOPY   

 HX WRIST FRACTURE TX   



Family History 

Problem Relation Age of Onset 

 Hypertension Mother  

 Polymyalgia rheumatica Mother  

 None Reported Father  



Social History 



Socioeconomic History 

 Marital status:  

  Spouse name: Not on file 

 Number of children: Not on file 

 Years of education: Not on file 

 Highest education level: Not on file 

Occupational History 

 Not on file 

Tobacco Use 

 Smoking status: Never Smoker 

 Smokeless tobacco: Never Used 

Substance and Sexual Activity 

 Alcohol use: Yes 

  Alcohol/week: 144.0 oz 

  Types: 12 Cans of beer per week 

 Drug use: No 

 Sexual activity: Not on file 

Other Topics Concern 

 Not on file 

Social History Narrative 

 Not on file 



 

Review of Systems 

Constitutional: Negative.  

HENT: Negative.  

Eyes: Negative.  

Respiratory: Negative.  

Cardiovascular: Negative.  

Gastrointestinal: Negative.  

Endocrine: Negative.  

Genitourinary: Negative.  

Musculoskeletal: Negative.  

Skin: Negative.  

Allergic/Immunologic: Negative.  

Neurological: Negative.  

Hematological: Negative.  

Psychiatric/Behavioral: Negative.  

All other systems reviewed and are negative.



Objective:       

 ALPRAZolam (XANAX) 0.25 mg tablet Take 0.25 mg by mouth at bedtime as 
needed for Anxiety. 

 amitriptyline (ELAVIL) 10 mg tablet Take 10 mg by mouth daily. 



Vitals: 

 19 0809 

BP: 155/89 

Pulse: 75 

Temp: 36.9 C (98.4 F) 

Weight: 83 kg (183 lb) 

Height: 175.3 cm (69") 



Body mass index is 27.02 kg/m. 



Physical Exam 

Constitutional: He is oriented to person, place, and time. He appears well-
developed and well-nourished. 

Neurological: He is alert and oriented to person, place, and time. 

Psychiatric: He has a normal mood and affect. His behavior is normal. 



 

Assessment and Plan:

43-year-old male with history of multiple GI issues including abdominal cramps, 
belching, gas/bloating, heartburn and gastroesophageal reflux disease.



GERD: LA grade B, currently on Nexium.

EGD showed >50 Eosinophil/ HPF from the lower esophagus.

This is mostly related to gastroesophageal reflux disease I discussed in detail 
about differential diagnosis which could be eosinophilic esophagitis. Of note 
patient denies any food allergy, seasonal allergy, childhood asthma.  This make 
diagnosis of eosinophilic esophagitis less likely.

We will repeat EGD with biopsy from upper and lower esophagus, after 3 months 
treatment with PPI.  EGD will be scheduled in midlate April.



Given history of significant belching, gas/bloating, delayed gastric emptying/
mild gastroparesis is in differential diagnosis.

Of note patient denies history of diabetes, or abdominal surgery.

4-hour gastric emptying test, to rule out delayed gastric emptying been ordered.



Refer to dietitian to discuss about diet to increase gas/bloating, and  dietary 
modification.



The patient raised the question of gluten sensitivity, celiac disease.

Biopsy from duodenal was negative for celiac disease.

And he tried a gluten-free diet for several months with no significant 
improvement.  This make non-celiac gluten sensitivity less likely.



Continue PPI as previously prescribed, and at her to antireflux diet/lifestyle 
modification.



It has been explained to the patient that PPIs are associated with a 



1. 50% Increase in risk of CKD

2. 3/4 increase in risk of Clostridium difficile infection

3. 1/3 Increase in risk of pneumonia

4. 1/3 increase in risk of all fractures



Large component of his symptoms is related to anxiety and stress, Xanax helps 
significantly.

Patient will continue Xanax 0.25 mg p.o. nightly, and will take second dose in 
a.m. if needed.



Follow-up in GI clinic in 3 months after all test done.



Probably discussed in detail with patient, he verbalized understanding and 
agreed.



Total face to face time spent with patient: 30 minutes with >50% of that time 
spent counseling the patient on medications, prior study results related to 
symptoms, differential diagnosis, and options regarding the plan of care. 



Thank you very much for the consult, and for allowing me to participate in this 
patient's care. 

This note was in part completed with Dragon, a voice recognition software.  
Some grammatical errors may have occurred.  If you have concerns,please contact 
my office for clarification.



   



 



 





Electronically signed by Devin Iqbal MD at 2019  9:45 AM CST

documented in this encounter



Plan of Treatment







    Order Schedule



  Name   Priority   Associated Diagnoses 

 

    Expected: 2019 (Approximate), Expires: 2020



  NM GASTRIC EMPTYING TIME   Routine   Bloating 



    Dyspepsia 



    Esophagitis 









    Order Schedule



  Name   Priority   Associated Diagnoses 

 

    Ordered: 2019



  AMB REFERRAL TO NUTRITION   Routine   Bloating 



    Dyspepsia 



    Esophagitis 

 

    Ordered: 2019



  AMB REFERRAL TO GI LAB FOR PROCEDURE   Routine   Bloating 



    Dyspepsia 



    Esophagitis 



documented as of this encounter



Visit Diagnoses











  Diagnosis

 





  Bloating



  Flatulence, eructation, and gas pain

 





  Dyspepsia



  Dyspepsia and other specified disorders of function of stomach

 





  Esophagitis



  Esophagitis, unspecified



documented in this encounter

## 2023-07-23 NOTE — XMS REPORT
Continuity of Care Document

 Created on: 2018



NICHOLAS ORLANDO

External Reference #: [id number]

: 1975

Sex: Male



Demographics







 Address  [address] [place] [address]

 

 Home Phone  [phone] x

 

 Preferred Language  Unknown

 

 Marital Status  Unknown

 

 [place] Affiliation  Unknown

 

 Race  Unknown

 

 Ethnic Group  Unknown





Author







 Author  [facility]

 

 Organization  [facility]

 

 Address  Unknown

 

 Phone  Unavailable



              



Allergies

      



There is no data.                  



Medications

      



There is no data.                  



Problems

      





 Date Dx Coded            Attending            Type            Code            
Diagnosis            Diagnosed By        

 

 2015            COLON DO, RACQUEL            Ot            285.9         
                         

 

 2015            COLON DO, RACQUEL            Ot            696.1         
                         

 

 2015            COLON DO, RACQUEL            Ot            719.40        
                          

 

 2015            COLON DO, RACQUEL            Ot            720.2         
                         

 

 2015            COLON DO, RACQUEL            Ot            782.1         
                         

 

 2015                         Ot            825.0                        
          

 

 2015                         Ot            E849.0                       
           

 

 2015                         Ot            E888.9                       
           

 

 2015            COLON DO, RACQUEL            Ot            285.9         
                         

 

 2015            COLON DO, RACQUEL            Ot            696.1         
                         

 

 2015            COLON DO, RACQUEL            Ot            719.40        
                          

 

 2015            COLON DO, RACQUEL            Ot            720.2         
                         

 

 2015            COLON DO, RACQUEL            Ot            782.1         
                         

 

 2015            COLON DO, RACQUEL            Ot            285.9         
                         

 

 2015            COLON DO, RACQUEL            Ot            696.1         
                         

 

 2015            COLON DO, RACQUEL            Ot            719.40        
                          

 

 2015            COLON DO, RACQUEL            Ot            720.2         
                         

 

 2015            COLON DO, RACQUEL            Ot            782.1         
                         

 

 2018            COLON DO, RACQUEL            Ot            285.9         
   ANEMIA NOS                     

 

 2018            COLON DO, RACQUEL            Ot            696.1         
   OTHER PSORIASIS                     

 

 2018            COLON DO, RACQUEL            Ot            719.40        
    JOINT PAIN-UNSPEC                     

 

 2018            COLON DO, RACQUEL            Ot            720.2         
   SACROILIITIS NEC                     

 

 2018            COLON DO, RACQUEL            Ot            782.1         
   NONSPECIF SKIN ERUPT NEC                     



                                                              



Procedures

      



There is no data.                  



Results

      





 Test            Result            Range        









 Complete blood count (CBC) with automated white blood cell (WBC) differential 
- 18 08:27         









 Blood leukocytes automated count (number/volume)            5.6 10*3/uL       
     4.3-11.0        

 

 Blood erythrocytes automated count (number/volume)            5.06 10*6/uL    
        4.35-5.85        

 

 Venous blood hemoglobin measurement (mass/volume)            15.2 g/dL        
    13.3-17.7        

 

 Blood hematocrit (volume fraction)            44 %            40-54        

 

 Automated erythrocyte mean corpuscular volume            86 [foz_us]          
  80-99        

 

 Automated erythrocyte mean corpuscular hemoglobin (mass per erythrocyte)      
      30 pg            25-34        

 

 Automated erythrocyte mean corpuscular hemoglobin concentration measurement (
mass/volume)            35 g/dL            32-36        

 

 Automated erythrocyte distribution width ratio            12.4 %            
10.0-14.5        

 

 Automated blood platelet count (count/volume)            246 10*3/uL          
  130-400        

 

 Automated blood platelet mean volume measurement            9.5 [foz_us]      
      7.4-10.4        

 

 Automated blood neutrophils/100 leukocytes            71 %            42-75   
     

 

 Automated blood lymphocytes/100 leukocytes            21 %            12-44   
     

 

 Blood monocytes/100 leukocytes            7 %            0-12        

 

 Automated blood eosinophils/100 leukocytes            0 %            0-10     
   

 

 Automated blood basophils/100 leukocytes            0 %            0-10        

 

 Blood neutrophils automated count (number/volume)            4.0 10*3         
   1.8-7.8        

 

 Blood lymphocytes automated count (number/volume)            1.2 10*3         
   1.0-4.0        

 

 Blood monocytes automated count (number/volume)            0.4 10*3            
0.0-1.0        

 

 Automated eosinophil count            0.0 10*3/uL            0.0-0.3        

 

 Automated blood basophil count (count/volume)            0.0 10*3/uL          
  0.0-0.1        









 Comprehensive metabolic panel - 18 08:27         









 Serum or plasma sodium measurement (moles/volume)            138 mmol/L       
     135-145        

 

 Serum or plasma potassium measurement (moles/volume)            4.0 mmol/L    
        3.6-5.0        

 

 Serum or plasma chloride measurement (moles/volume)            106 mmol/L     
               

 

 Carbon dioxide            23 mmol/L            21-32        

 

 Serum or plasma anion gap determination (moles/volume)            9 mmol/L    
        5-14        

 

 Serum or plasma urea nitrogen measurement (mass/volume)            13 mg/dL   
         7-18        

 

 Serum or plasma creatinine measurement (mass/volume)            0.85 mg/dL    
        0.60-1.30        

 

 Serum or plasma urea nitrogen/creatinine mass ratio            15             
NRG        

 

 Serum or plasma creatinine measurement with calculation of estimated 
glomerular filtration rate            >             NRG        

 

 Serum or plasma glucose measurement (mass/volume)            112 mg/dL        
            

 

 Serum or plasma calcium measurement (mass/volume)            9.8 mg/dL        
    8.5-10.1        

 

 Serum or plasma total bilirubin measurement (mass/volume)            1.1 mg/dL
            0.1-1.0        

 

 Serum or plasma alkaline phosphatase measurement (enzymatic activity/volume)  
          93 U/L                    

 

 Serum or plasma aspartate aminotransferase measurement (enzymatic activity/
volume)            15 U/L            5-34        

 

 Serum or plasma alanine aminotransferase measurement (enzymatic activity/volume
)            17 U/L            0-55        

 

 Serum or plasma protein measurement (mass/volume)            7.3 g/dL         
   6.4-8.2        

 

 Serum or plasma albumin measurement (mass/volume)            4.7 g/dL         
   3.2-4.5        









 Serum or plasma amylase measurement (enzymatic activity/volume) - 18 08:
27         









 Serum or plasma amylase measurement (enzymatic activity/volume)            64 U
/L                    









 Lipase - 18 08:27         









 Lipase            13 U/L            8-78        









 Erythrocyte sedimentation rate by westergren method - 18 08:27         









 Erythrocyte sedimentation rate by westergren method            2 mm            
0-15        









 Serum or plasma C reactive protein measurement (mass/volume) - 18 08:27 
        









 Serum or plasma C reactive protein measurement (mass/volume)            0.03 mg
/dL            0.00-0.50        









 Complete urinalysis with reflex to culture - 18 08:30         









 Urine color determination            YELLOW             NRG        

 

 Urine clarity determination            CLEAR             NRG        

 

 Urine pH measurement by test strip            6.5             5-9        

 

 Specific gravity of urine by test strip            1.010             1.016-
1.022        

 

 Urine protein assay by test strip, semi-quantitative            NEGATIVE      
       NEGATIVE        

 

 Urine glucose detection by automated test strip            NEGATIVE           
  NEGATIVE        

 

 Erythrocytes detection in urine sediment by light microscopy            1+    
         NEGATIVE        

 

 Urine ketones detection by automated test strip            3+             
NEGATIVE        

 

 Urine nitrite detection by test strip            NEGATIVE             NEGATIVE
        

 

 Urine total bilirubin detection by test strip            NEGATIVE             
NEGATIVE        

 

 Urine urobilinogen measurement by automated test strip (mass/volume)          
  NORMAL             NORMAL        

 

 Urine leukocyte esterase detection by dipstick            NEGATIVE             
NEGATIVE        

 

 Automated urine sediment erythrocyte count by microscopy (number/high power 
field)            RARE             NRG        

 

 Automated urine sediment leukocyte count by microscopy (number/high power field
)            NONE             NRG        

 

 Bacteria detection in urine sediment by light microscopy            NEGATIVE  
           NRG        

 

 Squamous epithelial cells detection in urine sediment by light microscopy     
       NONE             NRG        

 

 Crystals detection in urine sediment by light microscopy            NONE      
       NRG        

 

 Casts detection in urine sediment by light microscopy            NONE         
    NRG        

 

 Mucus detection in urine sediment by light microscopy            NEGATIVE     
        NRG        

 

 Complete urinalysis with reflex to culture            NO             NRG      
  



                            



Encounters

      





 ACCT No.            Visit Date/Time            Discharge            Status    
        Pt. Type            Provider            Facility            Loc./Unit  
          Complaint        

 

 [id number]            2018 07:51:00            2018 23:59:59    
        [facility]            Outpatient            RACQUEL COLON DO            [facility]            RAD            R10.9 ABD PAIN        

 

 [id number]            2015 16:37:00            2015 23:59:59    
        [facility]            Outpatient            RACQUEL COLON DO            [facility]            RAD            SACRUM PAIN,BACK PAIN,SI 
JOINT PAIN,ANEMIA,PSORIAS        

 

 [id number]            07/10/2008 10:49:00                                   
   Document Registration                                                       
     

 

 [id number]            2015 16:40:13                         [place]            
Document Registration Discharge instructions reviewed with patient. Patient verbalizes understanding of instructions and denies any questions at this time. Patient ambulates off unit at this time without signs of distress.

## 2024-03-19 NOTE — XMS REPORT
Vianey Stiles DO, FACP Clinical Summary

 Created on: 2015



[facility]

External Reference #: [id number]

: 1975

Sex: Male



Demographics







 Address  [address] [place]  [address]

 

 Home Phone  [phone]

 

 Preferred Language  Unknown

 

 Marital Status  M

 

 Mandaeism Affiliation  Unknown

 

 Race  Unknown

 

 Ethnic Group  Unknown





Author







 Author  User, [employer]

 

 Organization  [facility] Stiles DO, FACP

 

 Address  Unknown

 

 Phone  [phone]







Allergies, Adverse Reactions, Alerts







 Allergy Name  Reaction Description  Start Date  Severity  Status  Provider

 

 No Known Allergies             Vianey Stiles







Conditions or Problems







 Problem Name  Problem Code  Onset Date  Status  Entry Date  Provider  Comment  
Standard Description  Annotate

 

 SACROILIITIS, NOT ELSEWHERE CLASSIFIED  720.2    Active    
Vianey Stiles     Sacroiliitis, not elsewhere classified   

 

 OTHER PSORIASIS  696.1    Active    Vianey Stiles  
   Other psoriasis   

 

 BACK PAIN  724.5    Active    Vianey Stiles     
Backache, unspecified   

 

 MUSCLE PAIN  729.1    Active    Vianey Stiles     
Myalgia and myositis, unspecified   







Medication List







 Medication  Instructions  Start Date  Stop Date  Generic Name  NDC  Status  
Provider  Patient Instruction

 

 BETAMETHASONE DIPROPIONATE 0.05 % OINT  apply to affected areas prn       BETAMETHASONE DIPROPIONATE  [id number]  Active  Vianey Stiles   







Vital Signs







 Date  Name  Value  Unit  Range  Description

 

   blood pressure, diastolic - 8462-4  85  mm[Hg]     BP haney

 

   blood pressure, systolic - 8480-6  120  mm[Hg]     BP sys

 

   height E&M - 8302-2  69  [in_us]     Bdy height

 

   pulse rate E&M - 8867-4  82  /min     Heart rate

 

   respiratory rate E&M - 9279-1  14  /min     Resp rate

 

   temperature E&M  98.6  [degF]     Body temperature

 

   weight E&M - 3141-9  170  [lb_av]     Weight Measured







Diagnostic Results







 Date  Name  Value  Unit  Range  Description

 

 Clinical Lists Update: CBC,CMP,ESR,TSH,FREE T4,HGA1C,UA - Chemistry 

 

   glucose, plasma fasting  98  mg/dL      

 

   albumin, serum  4.4  g/dL      

 

   sodium, serum  138  mmol/L      

 

   bilirubin, serum, total  0.5  mg/dL      

 

   alanine aminotransferase (SGPT), serum  24  U/L      

 

   aspartate aminotransferase (SGOT), serum  17  U/L      

 

   protein, total, serum  7.0  g/dL      

 

   potassium, serum  3.8  mmol/L      

 

   thyroid stimulating hormone, serum  2.68  u[iU]/mL      

 

   hemoglobin A1C, blood, as % of total hemoglobin  5.8  %      

 

   thyroxine, serum, free  0.88  ng/dL      

 

   creatinine, serum  0.91  mg/dL      

 

   carbon dioxide, venous blood  23  mmol/L      

 

   chloride, serum  106  mmol/L      

 

   calcium, serum  9.3  mg/dL      

 

   urea nitrogen, blood  16  mg/dL      

 

   alkaline phosphatase, serum  80  U/L      

 

   Estimated Glomerular Filtration Rate (calc)  >60  mL/min/1.73m2   
   

 

 Clinical Lists Update: CBC,CMP,ESR,TSH,FREE T4,HGA1C,UA - Hematology 

 

   mean corpuscular volume, RBC  86  fL      

 

   red blood cell distribution width  12.4  %      

 

   leukocyte count, blood  6.7  10*3/mm3      

 

   erythrocyte sedimentation rate  6  mm/h      

 

   hematocrit, blood  42  %      

 

   hemoglobin, blood  14.7  g/dL      

 

   platelet count  215  10*3/mm3      

 

   erythrocyte (RBC) count  4.89  10*6/mm3      

 

 Clinical Lists Update: CBC,CMP,ESR,TSH,FREE T4,HGA1C,UA - Urinalysis 

 

   blood in urine (hemoglobin) by dipstick  neg         

 

   protein, urine, semiquantitative (dipstick)  neg         

 

   epithelial cells, urine  rare  /[LPF]      

 

   hyaline casts, urine  none  /[LPF]      

 

   bacteria, urine microscopy  neg         

 

   RBC urine by microscopy  none         

 

   WBC urine on microscopy  none  {Cells}/[HPF]      

 

   appearance, urine  Clear Yellow          

 

   urobilinogen, urine, semiquantitative (dipstick)  normal          

 

   specific gravity, urine  1.010         

 

   glucose, urine, semiquantitative  neg         

 

   bilirubin, urine  neg         

 

   ketones, urine, by test strip  neg         

 

   nitrite, urine, semiquantitative  neg         

 

   pH, urine, semiquantitative  6         

 

   mucus on urinalysis  neg         







Encounters







 Code  Encounter  Date  Provider  Facility

 

 CPT-98377  Ofc Vst, New Level III   17:08:21 CDT  [facility]ian[facility] OFFICE Not Applicable